# Patient Record
Sex: FEMALE | Race: ASIAN | Employment: UNEMPLOYED | URBAN - METROPOLITAN AREA
[De-identification: names, ages, dates, MRNs, and addresses within clinical notes are randomized per-mention and may not be internally consistent; named-entity substitution may affect disease eponyms.]

---

## 2021-06-29 ENCOUNTER — TELEPHONE (OUTPATIENT)
Dept: OBGYN CLINIC | Facility: CLINIC | Age: 31
End: 2021-06-29

## 2021-06-29 NOTE — TELEPHONE ENCOUNTER
Request for medical records was faxed to Gabriella Cedillo @ [phone].  # [phone] ext [address] [id number].

## 2021-06-30 ENCOUNTER — TELEPHONE (OUTPATIENT)
Dept: OBGYN CLINIC | Facility: CLINIC | Age: 31
End: 2021-06-30

## 2021-06-30 ENCOUNTER — INITIAL PRENATAL (OUTPATIENT)
Dept: OBGYN CLINIC | Facility: CLINIC | Age: 31
End: 2021-06-30
Payer: COMMERCIAL

## 2021-06-30 VITALS
HEIGHT: 66 IN | BODY MASS INDEX: 29.61 KG/M2 | WEIGHT: 184.25 LBS | DIASTOLIC BLOOD PRESSURE: 66 MMHG | HEART RATE: 82 BPM | SYSTOLIC BLOOD PRESSURE: 98 MMHG

## 2021-06-30 DIAGNOSIS — Z3A.11 11 WEEKS GESTATION OF PREGNANCY: ICD-10-CM

## 2021-06-30 DIAGNOSIS — Z34.81 ENCOUNTER FOR SUPERVISION OF OTHER NORMAL PREGNANCY IN FIRST TRIMESTER: Primary | ICD-10-CM

## 2021-06-30 PROBLEM — O99.211 OBESITY AFFECTING PREGNANCY IN FIRST TRIMESTER: Status: ACTIVE | Noted: 2021-06-30

## 2021-06-30 PROBLEM — E03.9 ACQUIRED HYPOTHYROIDISM: Status: ACTIVE | Noted: 2021-06-30

## 2021-06-30 PROBLEM — O99.211 OBESITY AFFECTING PREGNANCY IN FIRST TRIMESTER (HCC): Status: ACTIVE | Noted: 2021-06-30

## 2021-06-30 PROCEDURE — 3074F SYST BP LT 130 MM HG: CPT | Performed by: OBSTETRICS & GYNECOLOGY

## 2021-06-30 PROCEDURE — 3078F DIAST BP <80 MM HG: CPT | Performed by: OBSTETRICS & GYNECOLOGY

## 2021-06-30 PROCEDURE — 81002 URINALYSIS NONAUTO W/O SCOPE: CPT | Performed by: OBSTETRICS & GYNECOLOGY

## 2021-06-30 PROCEDURE — 3008F BODY MASS INDEX DOCD: CPT | Performed by: OBSTETRICS & GYNECOLOGY

## 2021-06-30 RX ORDER — BIOTIN 1 MG
1 TABLET ORAL DAILY
COMMUNITY
End: 2021-06-30 | Stop reason: ALTCHOICE

## 2021-06-30 RX ORDER — PROGESTERONE 200 MG/1
200 CAPSULE ORAL NIGHTLY
COMMUNITY
End: 2021-07-28

## 2021-06-30 RX ORDER — B-COMPLEX WITH VITAMIN C
TABLET ORAL ONCE
COMMUNITY
End: 2021-06-30 | Stop reason: ALTCHOICE

## 2021-06-30 RX ORDER — MULTIVITAMIN WITH IRON
TABLET ORAL ONCE
COMMUNITY
End: 2021-06-30 | Stop reason: ALTCHOICE

## 2021-06-30 RX ORDER — LEVOTHYROXINE AND LIOTHYRONINE 19; 4.5 UG/1; UG/1
30 TABLET ORAL DAILY
COMMUNITY
End: 2021-06-30 | Stop reason: ALTCHOICE

## 2021-06-30 NOTE — PROGRESS NOTES
05/20/2021 Early OB US No Fetal Pole Noted 5w5d MICHAEL: 01/15/2022    06/03/2021  Single Live IUP/Ys seen  7w1d MICHAEL: 01/19/2022

## 2021-06-30 NOTE — TELEPHONE ENCOUNTER
NIPS/Carrier screen drawn per Dr. Rooney Ripa order. Tubes labeled and placed in lab for orders and send-out.

## 2021-06-30 NOTE — PROGRESS NOTES
Transfer OB  - denies h/o HSV, blood transfusion, hepatitis  - cfDNA and carrier scree reviewed  - not sure about LMP, EDC by 7 week 1 day ultrasound  - PNL reviewed, HIV and antibody screen ordered    1.  Hypothyroidism  - takes medication from [facility]   - T

## 2021-07-03 ENCOUNTER — TELEPHONE (OUTPATIENT)
Dept: OBGYN CLINIC | Facility: CLINIC | Age: 31
End: 2021-07-03

## 2021-07-03 DIAGNOSIS — N93.9 VAGINAL SPOTTING: Primary | ICD-10-CM

## 2021-07-04 NOTE — TELEPHONE ENCOUNTER
On call physician    31year old  at [address]   B+    C/o vaginal spotting with wiping after a harder BM this evening for which she had to strain. No other issues other than intermittent dysuria occasionally since last visit.  Does not think she has a UTI

## 2021-07-05 LAB
AMB EXT MYRIAD TRISOMY 13: NEGATIVE
AMB EXT MYRIAD TRISOMY 18: NEGATIVE
AMB EXT MYRIAD TRISOMY 21: NEGATIVE

## 2021-07-09 ENCOUNTER — TELEPHONE (OUTPATIENT)
Dept: OBGYN CLINIC | Facility: CLINIC | Age: 31
End: 2021-07-09

## 2021-07-09 LAB
AMB EXT CYSTIC FIBROSIS ALLELE 1: NEGATIVE
AMB EXT MYRIAD FORESIGHT: NEGATIVE
AMB EXT SICKLE CELL SOLUBILITY: NEGATIVE

## 2021-07-26 ENCOUNTER — TELEPHONE (OUTPATIENT)
Dept: OBGYN CLINIC | Facility: CLINIC | Age: 31
End: 2021-07-26

## 2021-07-26 DIAGNOSIS — E03.9 HYPOTHYROIDISM AFFECTING PREGNANCY IN SECOND TRIMESTER: Primary | ICD-10-CM

## 2021-07-26 DIAGNOSIS — O99.282 HYPOTHYROIDISM AFFECTING PREGNANCY IN SECOND TRIMESTER: Primary | ICD-10-CM

## 2021-07-26 RX ORDER — LEVOTHYROXINE SODIUM 0.05 MG/1
50 TABLET ORAL
Qty: 30 TABLET | Refills: 11 | Status: SHIPPED | OUTPATIENT
Start: 2021-07-26 | End: 2021-10-01 | Stop reason: DRUGHIGH

## 2021-07-26 RX ORDER — LEVOTHYROXINE SODIUM 0.03 MG/1
12.5 TABLET ORAL
Qty: 15 TABLET | Refills: 11 | Status: SHIPPED | OUTPATIENT
Start: 2021-07-26 | End: 2021-10-01 | Stop reason: ALTCHOICE

## 2021-07-26 NOTE — TELEPHONE ENCOUNTER
Patient has 2 days left of her Thyronorm (thyroxine sodium) 62.5 mcg left. This medication is from [facility] and will need a replacement. Will route to provider for advisement. Has appt 7/28. Pt also c/o rash near butt and vagina.  Has been using coconut oil an

## 2021-07-26 NOTE — TELEPHONE ENCOUNTER
Patient is currently taking thyroid medication from [facility] and is asking if we can provide refill. Patient not sure about the name of the medication.    Patient is also experiencing rash around her vaginal area

## 2021-07-27 NOTE — TELEPHONE ENCOUNTER
Pt aware labs and meds ordered. Understanding verbalized. Pt states vaginal itching is a little more bothersome, advised to use vagisil otc and discuss with provider at appt tomorrow. Understanding verbalized.

## 2021-07-28 ENCOUNTER — ROUTINE PRENATAL (OUTPATIENT)
Dept: OBGYN CLINIC | Facility: CLINIC | Age: 31
End: 2021-07-28
Payer: COMMERCIAL

## 2021-07-28 VITALS
WEIGHT: 189.81 LBS | HEIGHT: 66 IN | SYSTOLIC BLOOD PRESSURE: 120 MMHG | DIASTOLIC BLOOD PRESSURE: 76 MMHG | BODY MASS INDEX: 30.51 KG/M2

## 2021-07-28 DIAGNOSIS — O26.892 LOW BACK PAIN DURING PREGNANCY IN SECOND TRIMESTER: ICD-10-CM

## 2021-07-28 DIAGNOSIS — O99.282 HYPOTHYROIDISM AFFECTING PREGNANCY IN SECOND TRIMESTER: ICD-10-CM

## 2021-07-28 DIAGNOSIS — O99.212 OBESITY AFFECTING PREGNANCY IN SECOND TRIMESTER: Primary | ICD-10-CM

## 2021-07-28 DIAGNOSIS — M54.50 LOW BACK PAIN DURING PREGNANCY IN SECOND TRIMESTER: ICD-10-CM

## 2021-07-28 DIAGNOSIS — E28.2 PCOS (POLYCYSTIC OVARIAN SYNDROME): ICD-10-CM

## 2021-07-28 DIAGNOSIS — E55.9 VITAMIN D DEFICIENCY: ICD-10-CM

## 2021-07-28 DIAGNOSIS — E03.9 HYPOTHYROIDISM AFFECTING PREGNANCY IN SECOND TRIMESTER: ICD-10-CM

## 2021-07-28 DIAGNOSIS — Z36.89 ENCOUNTER FOR FETAL ANATOMIC SURVEY: ICD-10-CM

## 2021-07-28 DIAGNOSIS — Z34.02 PREGNANCY, FIRST, SECOND TRIMESTER: ICD-10-CM

## 2021-07-28 PROBLEM — Z12.4 PAP SMEAR FOR CERVICAL CANCER SCREENING: Status: ACTIVE | Noted: 2021-01-22

## 2021-07-28 LAB
GLUCOSE (URINE DIPSTICK): NEGATIVE MG/DL
MULTISTIX LOT#: NORMAL NUMERIC
PROTEIN (URINE DIPSTICK): NEGATIVE MG/DL

## 2021-07-28 PROCEDURE — 3008F BODY MASS INDEX DOCD: CPT | Performed by: OBSTETRICS & GYNECOLOGY

## 2021-07-28 PROCEDURE — 3078F DIAST BP <80 MM HG: CPT | Performed by: OBSTETRICS & GYNECOLOGY

## 2021-07-28 PROCEDURE — 3074F SYST BP LT 130 MM HG: CPT | Performed by: OBSTETRICS & GYNECOLOGY

## 2021-07-28 PROCEDURE — 81002 URINALYSIS NONAUTO W/O SCOPE: CPT | Performed by: OBSTETRICS & GYNECOLOGY

## 2021-07-28 NOTE — PROGRESS NOTES
LALO  No FM. No cramping, vaginal bleeding. C/o some itching & rash of thighs - not too much near the vagina. Has been using coconut oil. Not feeling very sweaty. Discharge normal  Some sinus pressures/congestation & HA.    Legs feel heavy & sore for the

## 2021-07-28 NOTE — PATIENT INSTRUCTIONS
Please have TSH & 1 hour glucose (and AFP if desired) done soon. Increase protein intake. Caution with carbohydrates.      Nasal saline & nasal corticosteroid (Nasocort/Rhinocort)     AFP Level   There is an optional blood test that we can perform on yo to try.

## 2021-07-30 ENCOUNTER — TELEPHONE (OUTPATIENT)
Dept: OBGYN CLINIC | Facility: CLINIC | Age: 31
End: 2021-07-30

## 2021-07-30 ENCOUNTER — NURSE ONLY (OUTPATIENT)
Dept: OBGYN CLINIC | Facility: CLINIC | Age: 31
End: 2021-07-30
Payer: COMMERCIAL

## 2021-07-30 VITALS
BODY MASS INDEX: 31.02 KG/M2 | SYSTOLIC BLOOD PRESSURE: 114 MMHG | HEART RATE: 78 BPM | WEIGHT: 193 LBS | HEIGHT: 66 IN | DIASTOLIC BLOOD PRESSURE: 62 MMHG

## 2021-07-30 DIAGNOSIS — R39.9 URINARY SYMPTOM OR SIGN: Primary | ICD-10-CM

## 2021-07-30 LAB
APPEARANCE: CLEAR
BILIRUBIN: NEGATIVE
GLUCOSE (URINE DIPSTICK): NEGATIVE MG/DL
KETONES (URINE DIPSTICK): NEGATIVE MG/DL
LEUKOCYTES: NEGATIVE
MULTISTIX LOT#: NORMAL NUMERIC
NITRITE, URINE: NEGATIVE
OCCULT BLOOD: NEGATIVE
PH, URINE: 7 (ref 4.5–8)
PROTEIN (URINE DIPSTICK): NEGATIVE MG/DL
SPECIFIC GRAVITY: 1.02 (ref 1–1.03)
URINE-COLOR: YELLOW
UROBILINOGEN,SEMI-QN: 0.2 MG/DL (ref 0–1.9)

## 2021-07-30 PROCEDURE — 87086 URINE CULTURE/COLONY COUNT: CPT | Performed by: OBSTETRICS & GYNECOLOGY

## 2021-07-30 PROCEDURE — 3078F DIAST BP <80 MM HG: CPT | Performed by: OBSTETRICS & GYNECOLOGY

## 2021-07-30 PROCEDURE — 3008F BODY MASS INDEX DOCD: CPT | Performed by: OBSTETRICS & GYNECOLOGY

## 2021-07-30 PROCEDURE — 81002 URINALYSIS NONAUTO W/O SCOPE: CPT | Performed by: OBSTETRICS & GYNECOLOGY

## 2021-07-30 PROCEDURE — 3074F SYST BP LT 130 MM HG: CPT | Performed by: OBSTETRICS & GYNECOLOGY

## 2021-07-30 NOTE — TELEPHONE ENCOUNTER
Pt states she is having urinary pain, abdominal, and urgency/frequency. Per KD, pt scheduled for nurse visit urine screen. Understanding verbalized.

## 2021-08-06 ENCOUNTER — PATIENT MESSAGE (OUTPATIENT)
Dept: OBGYN CLINIC | Facility: CLINIC | Age: 31
End: 2021-08-06

## 2021-08-06 ENCOUNTER — TELEPHONE (OUTPATIENT)
Dept: OBGYN CLINIC | Facility: CLINIC | Age: 31
End: 2021-08-06

## 2021-08-06 PROBLEM — O99.810 ABNORMAL GLUCOSE TOLERANCE TEST (GTT) DURING PREGNANCY, ANTEPARTUM: Status: ACTIVE | Noted: 2021-08-06

## 2021-08-06 PROBLEM — O99.810 ABNORMAL GLUCOSE TOLERANCE TEST (GTT) DURING PREGNANCY, ANTEPARTUM (HCC): Status: ACTIVE | Noted: 2021-08-06

## 2021-08-06 LAB
ABSOLUTE BASOPHILS: 47 CELLS/UL (ref 0–200)
ABSOLUTE EOSINOPHILS: 153 CELLS/UL (ref 15–500)
ABSOLUTE LYMPHOCYTES: 2832 CELLS/UL (ref 850–3900)
ABSOLUTE MONOCYTES: 673 CELLS/UL (ref 200–950)
ABSOLUTE NEUTROPHILS: 8095 CELLS/UL (ref 1500–7800)
BASOPHILS: 0.4 %
EOSINOPHILS: 1.3 %
GLUCOSE, GESTATIONAL SCREEN (50G)-130 CUTOFF: 157 MG/DL
HEMATOCRIT: 35.6 % (ref 35–45)
HEMOGLOBIN: 11.6 G/DL (ref 11.7–15.5)
LYMPHOCYTES: 24 %
MCH: 28.2 PG (ref 27–33)
MCHC: 32.6 G/DL (ref 32–36)
MCV: 86.6 FL (ref 80–100)
MONOCYTES: 5.7 %
MPV: 11.9 FL (ref 7.5–12.5)
NEUTROPHILS: 68.6 %
PLATELET COUNT: 225 THOUSAND/UL (ref 140–400)
RDW: 12.9 % (ref 11–15)
RED BLOOD CELL COUNT: 4.11 MILLION/UL (ref 3.8–5.1)
TSH W/REFLEX TO FT4: 1.84 MIU/L
WHITE BLOOD CELL COUNT: 11.8 THOUSAND/UL (ref 3.8–10.8)

## 2021-08-06 NOTE — PROGRESS NOTES
Pt advised of results and recommendations for :  Anemia noted. Make sure taking prenatal vitamin that contains iron daily. Add in ferrous sulfate 325 mg tablet once daily at least 4 hours apart from prenatal vitamin.  Can get over the counter.      To maxim

## 2021-08-10 NOTE — TELEPHONE ENCOUNTER
From: Christal Landers  To: Pradeep Perrin MD  Sent: 8/6/2021 8:02 AM CDT  Subject: Test Results Question    Hi doc, the results i have received show no fasting but I was fasting for the 1 hr glucose test, does it make any difference?  I will go ahead and beatriz

## 2021-08-27 ENCOUNTER — ROUTINE PRENATAL (OUTPATIENT)
Dept: OBGYN CLINIC | Facility: CLINIC | Age: 31
End: 2021-08-27
Payer: COMMERCIAL

## 2021-08-27 VITALS
BODY MASS INDEX: 32.47 KG/M2 | WEIGHT: 202 LBS | DIASTOLIC BLOOD PRESSURE: 62 MMHG | HEIGHT: 66 IN | HEART RATE: 101 BPM | SYSTOLIC BLOOD PRESSURE: 102 MMHG

## 2021-08-27 DIAGNOSIS — Z3A.19 19 WEEKS GESTATION OF PREGNANCY: ICD-10-CM

## 2021-08-27 DIAGNOSIS — Z34.02 ENCOUNTER FOR SUPERVISION OF NORMAL FIRST PREGNANCY IN SECOND TRIMESTER: Primary | ICD-10-CM

## 2021-08-27 PROCEDURE — 3074F SYST BP LT 130 MM HG: CPT | Performed by: OBSTETRICS & GYNECOLOGY

## 2021-08-27 PROCEDURE — 3078F DIAST BP <80 MM HG: CPT | Performed by: OBSTETRICS & GYNECOLOGY

## 2021-08-27 PROCEDURE — 81002 URINALYSIS NONAUTO W/O SCOPE: CPT | Performed by: OBSTETRICS & GYNECOLOGY

## 2021-08-27 PROCEDURE — 3008F BODY MASS INDEX DOCD: CPT | Performed by: OBSTETRICS & GYNECOLOGY

## 2021-08-27 NOTE — PROGRESS NOTES
Patient c/o occasional round ligament discomfort  - failed early 1GTT at 16 weeks, passed 3 GTT - repeat after 24 weeks  - has ultrasound with MFM scheduled next week  - cfDNA and carrier screen negative, gender reveal this weekend      1.  Hypothyroidism

## 2021-09-01 ENCOUNTER — OFFICE VISIT (OUTPATIENT)
Dept: PERINATAL CARE | Facility: HOSPITAL | Age: 31
End: 2021-09-01
Attending: OBSTETRICS & GYNECOLOGY
Payer: COMMERCIAL

## 2021-09-01 VITALS
WEIGHT: 202 LBS | HEART RATE: 76 BPM | BODY MASS INDEX: 33 KG/M2 | DIASTOLIC BLOOD PRESSURE: 78 MMHG | SYSTOLIC BLOOD PRESSURE: 127 MMHG

## 2021-09-01 DIAGNOSIS — E03.9 HYPOTHYROIDISM AFFECTING PREGNANCY IN SECOND TRIMESTER: ICD-10-CM

## 2021-09-01 DIAGNOSIS — O99.282 HYPOTHYROIDISM AFFECTING PREGNANCY IN SECOND TRIMESTER: Primary | ICD-10-CM

## 2021-09-01 DIAGNOSIS — E03.9 HYPOTHYROIDISM AFFECTING PREGNANCY IN SECOND TRIMESTER: Primary | ICD-10-CM

## 2021-09-01 DIAGNOSIS — O99.212 OBESITY AFFECTING PREGNANCY IN SECOND TRIMESTER: ICD-10-CM

## 2021-09-01 DIAGNOSIS — E66.3 PATIENT OVERWEIGHT: ICD-10-CM

## 2021-09-01 DIAGNOSIS — O99.282 HYPOTHYROIDISM AFFECTING PREGNANCY IN SECOND TRIMESTER: ICD-10-CM

## 2021-09-01 PROCEDURE — 99203 OFFICE O/P NEW LOW 30 MIN: CPT | Performed by: OBSTETRICS & GYNECOLOGY

## 2021-09-01 PROCEDURE — 76811 OB US DETAILED SNGL FETUS: CPT | Performed by: OBSTETRICS & GYNECOLOGY

## 2021-09-01 RX ORDER — MELATONIN
325
COMMUNITY

## 2021-09-01 NOTE — PROGRESS NOTES
[facility] Consultation    Dear Dr. Osito Bazan    Thank you for requesting ultrasound evaluation and maternal fetal medicine consultation on your patient [place] Julio.   As you are aware she is a 27year old female  with a singlet • Thyroid Disorder Mother    • Hypertension Mother       Social History    Tobacco Use      Smoking status: Never Smoker      Smokeless tobacco: Never Used    Vaping Use      Vaping Use: Never used    Alcohol use: Not Currently    Drug use: Never rate of first trimester spontaneous . The risk of complications during pregnancy is lower in women with subclinical, rather than overt hypothyroidism.  However, in some studies, women with subclinical hypothyroidism were also reported to be at incr during pregnancy are 1.5-fold higher than in nonpregnant women. We do not suggest the treatment of pregnant women with isolated hypothyroxinemia (low free T4, normal TSH).   Thyroid function should be monitored throughout the pregnancy by assessing TSH ev

## 2021-09-10 ENCOUNTER — TELEPHONE (OUTPATIENT)
Dept: OBGYN CLINIC | Facility: CLINIC | Age: 31
End: 2021-09-10

## 2021-09-10 NOTE — TELEPHONE ENCOUNTER
Pt calling to speak to a nurse about headaches and pressure in her head. Pt states it could be allergies but she has not been taking anything and would like to know what she can take. Please advise.

## 2021-09-10 NOTE — TELEPHONE ENCOUNTER
Spoke with patient and discussed pregnancy safe medications for pregnancy that was placed in her mychart. She also wanted to know if she should increase her thyroid medication based on her recent labs.  She is on 62.5 of levothyroxine and her levels were WN

## 2021-09-24 ENCOUNTER — ROUTINE PRENATAL (OUTPATIENT)
Dept: OBGYN CLINIC | Facility: CLINIC | Age: 31
End: 2021-09-24
Payer: COMMERCIAL

## 2021-09-24 VITALS
WEIGHT: 209.19 LBS | BODY MASS INDEX: 33.62 KG/M2 | DIASTOLIC BLOOD PRESSURE: 76 MMHG | HEART RATE: 75 BPM | SYSTOLIC BLOOD PRESSURE: 122 MMHG | HEIGHT: 66 IN

## 2021-09-24 DIAGNOSIS — E03.9 HYPOTHYROIDISM AFFECTING PREGNANCY IN SECOND TRIMESTER: ICD-10-CM

## 2021-09-24 DIAGNOSIS — O99.282 HYPOTHYROIDISM AFFECTING PREGNANCY IN SECOND TRIMESTER: ICD-10-CM

## 2021-09-24 DIAGNOSIS — Z34.92 ENCOUNTER FOR SUPERVISION OF NORMAL PREGNANCY IN SECOND TRIMESTER, UNSPECIFIED GRAVIDITY: Primary | ICD-10-CM

## 2021-09-24 PROCEDURE — 3074F SYST BP LT 130 MM HG: CPT | Performed by: STUDENT IN AN ORGANIZED HEALTH CARE EDUCATION/TRAINING PROGRAM

## 2021-09-24 PROCEDURE — 3008F BODY MASS INDEX DOCD: CPT | Performed by: STUDENT IN AN ORGANIZED HEALTH CARE EDUCATION/TRAINING PROGRAM

## 2021-09-24 PROCEDURE — 3078F DIAST BP <80 MM HG: CPT | Performed by: STUDENT IN AN ORGANIZED HEALTH CARE EDUCATION/TRAINING PROGRAM

## 2021-09-24 PROCEDURE — 81002 URINALYSIS NONAUTO W/O SCOPE: CPT | Performed by: STUDENT IN AN ORGANIZED HEALTH CARE EDUCATION/TRAINING PROGRAM

## 2021-09-24 NOTE — PROGRESS NOTES
Shoulder pain & sciatica pain flaring up, offered PT but pt may just try to do more yoga  Allergies--assured OK to take claritin  Asked about Vit D--ok to take 8267-4970/d    - failed early 1GTT at 16 weeks, passed 3 GTT - repeat & CBC ordered for 24wk  -

## 2021-09-27 ENCOUNTER — PATIENT MESSAGE (OUTPATIENT)
Dept: OBGYN CLINIC | Facility: CLINIC | Age: 31
End: 2021-09-27

## 2021-09-28 ENCOUNTER — TELEPHONE (OUTPATIENT)
Dept: OBGYN CLINIC | Facility: CLINIC | Age: 31
End: 2021-09-28

## 2021-09-28 ENCOUNTER — HOSPITAL ENCOUNTER (OUTPATIENT)
Facility: HOSPITAL | Age: 31
Setting detail: OBSERVATION
Discharge: HOME | End: 2021-09-28
Attending: STUDENT IN AN ORGANIZED HEALTH CARE EDUCATION/TRAINING PROGRAM | Admitting: STUDENT IN AN ORGANIZED HEALTH CARE EDUCATION/TRAINING PROGRAM
Payer: COMMERCIAL

## 2021-09-28 VITALS
WEIGHT: 209 LBS | DIASTOLIC BLOOD PRESSURE: 72 MMHG | RESPIRATION RATE: 16 BRPM | SYSTOLIC BLOOD PRESSURE: 119 MMHG | HEART RATE: 74 BPM | BODY MASS INDEX: 33.59 KG/M2 | HEIGHT: 66 IN | TEMPERATURE: 99 F

## 2021-09-28 PROBLEM — Z34.90 PREGNANCY: Status: ACTIVE | Noted: 2021-09-28

## 2021-09-28 PROBLEM — Z34.90 PREGNANCY (HCC): Status: ACTIVE | Noted: 2021-09-28

## 2021-09-28 PROCEDURE — 99212 OFFICE O/P EST SF 10 MIN: CPT

## 2021-09-28 NOTE — TELEPHONE ENCOUNTER
From: Columba Carrillo  To: Gadiel Daniels MD  Sent: 9/27/2021 2:04 PM CDT  Subject: Prenatemini prescription     Hi Doc,  I was just following up from my last visit, I like the prenatemini tablets that you gave as a sample.  Is it possible for sending a pres

## 2021-09-28 NOTE — TELEPHONE ENCOUNTER
Spoke with pt regarding previous tel encounter from 9/288/21. Advised to go to l&d since headache unrelieved after 1000 mg of tylenol. Understanding verbalized. L&D notified.

## 2021-09-28 NOTE — TELEPHONE ENCOUNTER
Pt states she is having leg pain, and ankle and joint pain, some swelling around joints. Advised to call pcp as problem is not pregnancy related or gynecological in nature. Understanding verbalized.    Pt advised order is in for PT since July, can call and

## 2021-09-29 RX ORDER — ASCORBIC ACID, CHOLECALCIFEROL, .ALPHA.-TOCOPHEROL ACETATE, DL-, PYRIDOXINE HYDROCHLORIDE, FOLIC ACID, CYANOCOBALAMIN, BIOTIN, CALCIUM CARBONATE, FERROUS ASPARTO GLYCINATE, IRON, POTASSIUM IODIDE, MAGNESIUM OXIDE, DOCONEXENT AND LOWBUSH BLUEBERRY 60; 1000; 10; 26; 400; 13; 280; 80; 9; 9; 150; 25; 350; 25; 600 MG/1; [IU]/1; [IU]/1; MG/1; UG/1; UG/1; UG/1; MG/1; MG/1; MG/1; UG/1; MG/1; MG/1; MG/1; UG/1
1 CAPSULE, GELATIN COATED ORAL DAILY
Qty: 90 CAPSULE | Refills: 3 | Status: SHIPPED | OUTPATIENT
Start: 2021-09-29 | End: 2021-10-29

## 2021-09-29 NOTE — NST
Nonstress Test   Patient: Aga Villeda    Gestation: 23w6d    NST:       Variability: Moderate           Accelerations: Yes           Decelerations: Variable            Baseline: 140 BPM           Uterine Irritability: No           Contractions: Not presen

## 2021-09-29 NOTE — PROGRESS NOTES
NST appropriate for gestational age
Pt , edc 22 (23 6/7wks) admitted to triage rm 3 with c/o headache all day. Pt took Tylenol ES at 1300 with no relief. Pt states she took [address] last week for a stuff nose and headache and that it was effective but she has not taken any today.  Pt
poc discussed with pt & spouse. Pt would rather go home and f/u with primary doctor tomorrow vs going to ER now. Pt states she will go home, take Clariton and rest. Discharge instructions reviewed with pt & spouse, verbalizes understanding.  Pt discharged t
room air

## 2021-09-30 ENCOUNTER — TELEPHONE (OUTPATIENT)
Dept: FAMILY MEDICINE CLINIC | Facility: CLINIC | Age: 31
End: 2021-09-30

## 2021-09-30 NOTE — TELEPHONE ENCOUNTER
S/w pt regarding her appt for tomorrow. Reports having pain in rt leg up to her shoulder \"for a few days\". Reports intermittent. Hx of shoulder tendinitis. Feels muscles have been tight. Had similar pain on L leg but walking would help.   Feels leg i

## 2021-09-30 NOTE — TELEPHONE ENCOUNTER
We will see her tomorrow. At any point worsening symptoms proceed to urgent care for evaluation , may need ultrasound to rule out clot.   Thank you

## 2021-10-01 ENCOUNTER — OFFICE VISIT (OUTPATIENT)
Dept: FAMILY MEDICINE CLINIC | Facility: CLINIC | Age: 31
End: 2021-10-01
Payer: COMMERCIAL

## 2021-10-01 ENCOUNTER — TELEPHONE (OUTPATIENT)
Dept: FAMILY MEDICINE CLINIC | Facility: CLINIC | Age: 31
End: 2021-10-01

## 2021-10-01 ENCOUNTER — HOSPITAL ENCOUNTER (OUTPATIENT)
Dept: ULTRASOUND IMAGING | Facility: HOSPITAL | Age: 31
Discharge: HOME | End: 2021-10-01
Attending: FAMILY MEDICINE
Payer: COMMERCIAL

## 2021-10-01 VITALS
DIASTOLIC BLOOD PRESSURE: 58 MMHG | RESPIRATION RATE: 18 BRPM | OXYGEN SATURATION: 100 % | WEIGHT: 208 LBS | TEMPERATURE: 98 F | SYSTOLIC BLOOD PRESSURE: 104 MMHG | BODY MASS INDEX: 33.43 KG/M2 | HEIGHT: 66 IN | HEART RATE: 85 BPM

## 2021-10-01 DIAGNOSIS — M54.50 RECURRENT LOW BACK PAIN: ICD-10-CM

## 2021-10-01 DIAGNOSIS — M79.604 ACUTE LEG PAIN, RIGHT: ICD-10-CM

## 2021-10-01 DIAGNOSIS — M54.2 NECK PAIN ON RIGHT SIDE: ICD-10-CM

## 2021-10-01 DIAGNOSIS — M79.604 ACUTE LEG PAIN, RIGHT: Primary | ICD-10-CM

## 2021-10-01 DIAGNOSIS — M79.89 RIGHT LEG SWELLING: ICD-10-CM

## 2021-10-01 DIAGNOSIS — Z3A.24 24 WEEKS GESTATION OF PREGNANCY: ICD-10-CM

## 2021-10-01 PROCEDURE — 3008F BODY MASS INDEX DOCD: CPT | Performed by: FAMILY MEDICINE

## 2021-10-01 PROCEDURE — 93971 EXTREMITY STUDY: CPT | Performed by: FAMILY MEDICINE

## 2021-10-01 PROCEDURE — 99204 OFFICE O/P NEW MOD 45 MIN: CPT | Performed by: FAMILY MEDICINE

## 2021-10-01 PROCEDURE — 3078F DIAST BP <80 MM HG: CPT | Performed by: FAMILY MEDICINE

## 2021-10-01 PROCEDURE — 3074F SYST BP LT 130 MM HG: CPT | Performed by: FAMILY MEDICINE

## 2021-10-01 RX ORDER — MULTIVITAMIN WITH IRON
1 TABLET ORAL AS NEEDED
COMMUNITY
Start: 2021-08-09

## 2021-10-01 NOTE — PROGRESS NOTES
Adwoa Felix is a 27year old female. cc right leg pain, low back pain, right shoulder arm pain/neck pain,  24 weeks pregnant  HPI:   Pts new in our office patient is 24 weeks pregnant.   Patient complains that for the last 4 days started to have a pain in h Diagnosis Date   • Chronic low back pain     predating pregnancy, pt points to SI joint region   • Hypothyroidism    • Pap smear for cervical cancer screening 01/22/2021    Pap & HPV negative with outside provider   • PCOS (polycystic ovarian syndrome) side  Recurrent low back pain    No orders of the defined types were placed in this encounter.       Meds & Refills for this Visit:  Requested Prescriptions      No prescriptions requested or ordered in this encounter   Do ultrasound venous Doppler today of

## 2021-10-01 NOTE — PATIENT INSTRUCTIONS
Do ultrasound venous Doppler today of your right leg. If the test is negative for blood clot we will have you to try icy hot topically to your lower back and right side of the neck as needed. Warm compresses to your neck. Try some yoga stretches.   Sched

## 2021-10-08 ENCOUNTER — TELEPHONE (OUTPATIENT)
Dept: PHYSICAL THERAPY | Facility: HOSPITAL | Age: 31
End: 2021-10-08

## 2021-10-13 ENCOUNTER — TELEPHONE (OUTPATIENT)
Dept: OBGYN CLINIC | Facility: CLINIC | Age: 31
End: 2021-10-13

## 2021-10-13 ENCOUNTER — OFFICE VISIT (OUTPATIENT)
Dept: PHYSICAL THERAPY | Facility: HOSPITAL | Age: 31
End: 2021-10-13
Attending: OBSTETRICS & GYNECOLOGY
Payer: COMMERCIAL

## 2021-10-13 DIAGNOSIS — O26.892 LOW BACK PAIN DURING PREGNANCY IN SECOND TRIMESTER: ICD-10-CM

## 2021-10-13 DIAGNOSIS — O99.810 ABNORMAL MATERNAL GLUCOSE TOLERANCE, ANTEPARTUM: Primary | ICD-10-CM

## 2021-10-13 DIAGNOSIS — M54.50 LOW BACK PAIN DURING PREGNANCY IN SECOND TRIMESTER: ICD-10-CM

## 2021-10-13 PROCEDURE — 97162 PT EVAL MOD COMPLEX 30 MIN: CPT

## 2021-10-13 PROCEDURE — 97110 THERAPEUTIC EXERCISES: CPT

## 2021-10-13 PROCEDURE — 97140 MANUAL THERAPY 1/> REGIONS: CPT

## 2021-10-13 NOTE — TELEPHONE ENCOUNTER
Pt is calling to ask about the glucose test. Pt states Dr. Rock Guallpa told her to get it done (she doesn't remember when) but the lab stated they do not have the order. Please advise if pt needs Glucose test done now or wait.

## 2021-10-13 NOTE — TELEPHONE ENCOUNTER
I spoke with pt. I advised her to get her 3 hour glucose test done as soon as possible. I placed a new order for [facility] per her request. Verbalized understanding.

## 2021-10-13 NOTE — PROGRESS NOTES
SPINE EVALUATION:   Referring Physician: Dr. Wing Dexter, Dr. Halina Drake  Diagnosis: 24 weeks gestation of pregnancy (Z3A.24)  Neck pain on right side (M54.2)  Recurrent low back pain (M54.50)          Date of Service: 10/13/2021     PATIENT SUMMARY   Ak tension R sciatic nn, but cervical nn testing was not positive in clinic today. Increased symptoms may be attributed to hormonal changes due to pregnancy.   Functional deficits include but are not limited to  cooking, sitting, housework, sleeping distrubanc 4+/5  Hip Extension: R 4-/5; L 4+/5   Hip ER: R 5/5; L 5/5  Hip IR: R 5/5; L 5/5  Knee Flexion: R 5/5; L 5/5   Knee extension (L3): R 5/5; L 5/5   DF (L4): R 5/5; L 5/5  Great Toe Ext (L5): R 5/5, L 5/5  PF (S1): R 5/5; L 5/5-non weightbearing     Flexibil transversus abdominis recruitment to perform proper isometric contraction without requiring verbal or tactile cuing to promote advancement of therex   · Pt will demonstrate good understanding of proper posture and body mechanics to decrease pain and improv treatment and while under my care.     X___________________________________________________ Date____________________    Certification From: 17/47/2917  To:1/11/2022

## 2021-10-15 ENCOUNTER — ROUTINE PRENATAL (OUTPATIENT)
Dept: OBGYN CLINIC | Facility: CLINIC | Age: 31
End: 2021-10-15
Payer: COMMERCIAL

## 2021-10-15 VITALS
WEIGHT: 214.38 LBS | HEIGHT: 66 IN | BODY MASS INDEX: 34.45 KG/M2 | DIASTOLIC BLOOD PRESSURE: 74 MMHG | SYSTOLIC BLOOD PRESSURE: 122 MMHG | HEART RATE: 69 BPM

## 2021-10-15 DIAGNOSIS — Z3A.26 26 WEEKS GESTATION OF PREGNANCY: ICD-10-CM

## 2021-10-15 DIAGNOSIS — Z23 NEED FOR VACCINATION: ICD-10-CM

## 2021-10-15 DIAGNOSIS — Z34.92 ENCOUNTER FOR SUPERVISION OF NORMAL PREGNANCY IN SECOND TRIMESTER, UNSPECIFIED GRAVIDITY: Primary | ICD-10-CM

## 2021-10-15 PROCEDURE — 3074F SYST BP LT 130 MM HG: CPT | Performed by: OBSTETRICS & GYNECOLOGY

## 2021-10-15 PROCEDURE — 3078F DIAST BP <80 MM HG: CPT | Performed by: OBSTETRICS & GYNECOLOGY

## 2021-10-15 PROCEDURE — 3008F BODY MASS INDEX DOCD: CPT | Performed by: OBSTETRICS & GYNECOLOGY

## 2021-10-15 PROCEDURE — 90686 IIV4 VACC NO PRSV 0.5 ML IM: CPT | Performed by: OBSTETRICS & GYNECOLOGY

## 2021-10-15 PROCEDURE — 90471 IMMUNIZATION ADMIN: CPT | Performed by: OBSTETRICS & GYNECOLOGY

## 2021-10-15 PROCEDURE — 81002 URINALYSIS NONAUTO W/O SCOPE: CPT | Performed by: OBSTETRICS & GYNECOLOGY

## 2021-10-15 RX ORDER — LORATADINE 10 MG/1
10 TABLET ORAL DAILY
COMMUNITY
End: 2021-12-30

## 2021-10-15 NOTE — PROGRESS NOTES
Patient has no complaints    Allergies--assured OK to take claritin  Asked about Vit D--ok to take 3515-7793/d    - failed 1 GTT - has 3 GTT scheduled  - normal 20 week ultrasound with MFM  - cfDNA and carrier screen negative    1.  Hypothyroidism  - levoth

## 2021-10-19 LAB
GLUCOSE, 1 HOUR: 181 MG/DL
GLUCOSE, 2 HOUR: 126 MG/DL
GLUCOSE, 3 HOUR: 63 MG/DL
GLUCOSE, FASTING: 91 MG/DL (ref 65–94)

## 2021-10-20 ENCOUNTER — OFFICE VISIT (OUTPATIENT)
Dept: PHYSICAL THERAPY | Facility: HOSPITAL | Age: 31
End: 2021-10-20
Attending: OBSTETRICS & GYNECOLOGY
Payer: COMMERCIAL

## 2021-10-20 PROCEDURE — 97110 THERAPEUTIC EXERCISES: CPT

## 2021-10-20 PROCEDURE — 97140 MANUAL THERAPY 1/> REGIONS: CPT

## 2021-10-20 NOTE — PROGRESS NOTES
Dx: 24 weeks gestation of pregnancy (Z3A.24)  Neck pain on right side (M54.2)  Recurrent low back pain (M54.50)         Authorized # of Visits:  No limit, no auth, POC 12         Next MD visit: none scheduled  Fall Risk: standard         Precautions: n/a pain to improve ease with activities of daily living . Date: 10/20/2021  Tx#: 2/12 Date: Tx#: 3/ Date: Tx#: 4/ Date: Tx#: 5/ Date: Tx#: 6/ Date: Tx#: 7/ Date:    Tx#: 8/   NuStep 6 min L1         Chin tuck 5 sec x10    Shoulder rolls bw x10 predating pregnancy, pt points to SI joint region   • Hypothyroidism    • Pap smear for cervical cancer screening 01/22/2021    Pap & HPV negative with outside provider   • PCOS (polycystic ovarian syndrome)    • Screening for genetic disease carrier statu R>L AA jt  Palpation: tenderness/tightness R cervical paraspinals/suboccipitals, upper and mid trap, levator scap, R lumbar paraspinals, gluteus medius, gluteus miller, tenderness R sciatic nn    Strength: (* denotes performed with pain)  UE/Scapular LE x10, sitting-sciatic nn glides x10, abdominal bracing 10 sec x10 , Kegel 10 repetitions 3x/day, 10 sec on/10 sec off, 2 sec on 2-4 sec off , pelvic brace + iso hip add 10 sec x10 BID  MT-STM R cervical paraspinals, suboccipitals, upper and mid trap

## 2021-10-25 ENCOUNTER — OFFICE VISIT (OUTPATIENT)
Dept: PHYSICAL THERAPY | Facility: HOSPITAL | Age: 31
End: 2021-10-25
Attending: FAMILY MEDICINE
Payer: COMMERCIAL

## 2021-10-25 PROCEDURE — 97110 THERAPEUTIC EXERCISES: CPT

## 2021-10-25 PROCEDURE — 97140 MANUAL THERAPY 1/> REGIONS: CPT

## 2021-10-25 NOTE — PROGRESS NOTES
Dx: 24 weeks gestation of pregnancy (Z3A.24)  Neck pain on right side (M54.2)  Recurrent low back pain (M54.50)         Authorized # of Visits:  No limit, no auth, POC 12         Next MD visit: none scheduled  Fall Risk: standard         Precautions: n/a compliant with comprehensive HEP to maintain progress achieved in PT     Plan: Continue plan of care as pt tolerates with focus on improving  Posture, body mechanics, reduction of muscle tightness and pain to improve ease with activities of daily living . has worsened with pregnancy. Pregnant 26 weeks. Having R LE pain that goes to ankle. Has noticed more difficulty with squeezing motions R hand.   Pt describes pain level:  Shoulder Back: 0-7/10, Neck 0-9/10 Shoulder 0-9/10  Current functional limitations in is medically necessary to address the above impairments and reach functional goals.      Precautions:  None  OBJECTIVE:   Observation/Posture: rounded shoulders, slouched sitting, increased lumbar lordosis, fair body mechanics with transferring  Neuro Scree Ligament Testing: R neg, L neg  Cervical Compression: no change  Cervical Distraction: no change  spurling's neg R and L    Gait: pt ambulates on level ground with antalgia.   Balance: SLS R 15sec, L 30 sec    Today’s Treatment and Response:   Pt education

## 2021-10-27 ENCOUNTER — OFFICE VISIT (OUTPATIENT)
Dept: PHYSICAL THERAPY | Facility: HOSPITAL | Age: 31
End: 2021-10-27
Attending: FAMILY MEDICINE
Payer: COMMERCIAL

## 2021-10-27 PROCEDURE — 97110 THERAPEUTIC EXERCISES: CPT

## 2021-10-27 PROCEDURE — 97112 NEUROMUSCULAR REEDUCATION: CPT

## 2021-10-27 NOTE — PROGRESS NOTES
Dx: 24 weeks gestation of pregnancy (Z3A.24)  Neck pain on right side (M54.2)  Recurrent low back pain (M54.50)         Authorized # of Visits:  No limit, no auth, POC 12         Next MD visit: none scheduled  Fall Risk: standard         Precautions: n/a comprehensive HEP to maintain progress achieved in PT     Plan: Continue plan of care as pt tolerates with focus on improving  Posture, body mechanics, reduction of muscle tightness and pain to improve ease with activities of daily living .  Next visit: timothy services provided     Charges: TEx2, NM Re-ed   Total Timed Treatment: 45 min     Total Treatment Time: 45 min      Initial evaluation: :    PATIENT SUMMARY   Shirlene Conn is a 27year old female who presents to therapy today with complaints of R>L pain-ba symptoms may be attributed to hormonal changes due to pregnancy. Functional deficits include but are not limited to  cooking, sitting, housework, sleeping distrubance.   Signs and symptoms are consistent with diagnosis of 24 weeks gestation of pregnancy (Z Flexion: R 5/5; L 5/5   Knee extension (L3): R 5/5; L 5/5   DF (L4): R 5/5; L 5/5  Great Toe Ext (L5): R 5/5, L 5/5  PF (S1): R 5/5; L 5/5-non weightbearing     Flexibility:   UE/Scapular LE   Upper Trap: R min; L min  Levator Scap: R min; L min  Pec Major

## 2021-11-01 ENCOUNTER — ROUTINE PRENATAL (OUTPATIENT)
Dept: OBGYN CLINIC | Facility: CLINIC | Age: 31
End: 2021-11-01
Payer: COMMERCIAL

## 2021-11-01 VITALS
BODY MASS INDEX: 35.36 KG/M2 | WEIGHT: 220 LBS | HEART RATE: 81 BPM | SYSTOLIC BLOOD PRESSURE: 120 MMHG | DIASTOLIC BLOOD PRESSURE: 76 MMHG | HEIGHT: 66 IN

## 2021-11-01 DIAGNOSIS — Z34.93 ENCOUNTER FOR SUPERVISION OF NORMAL PREGNANCY IN THIRD TRIMESTER, UNSPECIFIED GRAVIDITY: Primary | ICD-10-CM

## 2021-11-01 DIAGNOSIS — O99.213 OBESITY AFFECTING PREGNANCY IN THIRD TRIMESTER: ICD-10-CM

## 2021-11-01 PROCEDURE — 90715 TDAP VACCINE 7 YRS/> IM: CPT | Performed by: STUDENT IN AN ORGANIZED HEALTH CARE EDUCATION/TRAINING PROGRAM

## 2021-11-01 PROCEDURE — 81002 URINALYSIS NONAUTO W/O SCOPE: CPT | Performed by: STUDENT IN AN ORGANIZED HEALTH CARE EDUCATION/TRAINING PROGRAM

## 2021-11-01 PROCEDURE — 3008F BODY MASS INDEX DOCD: CPT | Performed by: STUDENT IN AN ORGANIZED HEALTH CARE EDUCATION/TRAINING PROGRAM

## 2021-11-01 PROCEDURE — 3074F SYST BP LT 130 MM HG: CPT | Performed by: STUDENT IN AN ORGANIZED HEALTH CARE EDUCATION/TRAINING PROGRAM

## 2021-11-01 PROCEDURE — 3078F DIAST BP <80 MM HG: CPT | Performed by: STUDENT IN AN ORGANIZED HEALTH CARE EDUCATION/TRAINING PROGRAM

## 2021-11-01 PROCEDURE — 90471 IMMUNIZATION ADMIN: CPT | Performed by: STUDENT IN AN ORGANIZED HEALTH CARE EDUCATION/TRAINING PROGRAM

## 2021-11-01 RX ORDER — CYCLOBENZAPRINE HCL 5 MG
5 TABLET ORAL 3 TIMES DAILY PRN
Qty: 45 TABLET | Refills: 0 | Status: SHIPPED | OUTPATIENT
Start: 2021-11-01 | End: 2021-11-15

## 2021-11-01 NOTE — PROGRESS NOTES
Back pain & R leg pain as below. Also not feeling consistent fetal movement throughout day. Usually feels after lunch. But worried that people tell her she should be feeling lots of movement by now.  Discussed kick counts in quiet place without distractio

## 2021-11-03 ENCOUNTER — APPOINTMENT (OUTPATIENT)
Dept: PHYSICAL THERAPY | Facility: HOSPITAL | Age: 31
End: 2021-11-03
Payer: COMMERCIAL

## 2021-11-03 ENCOUNTER — TELEPHONE (OUTPATIENT)
Dept: PHYSICAL THERAPY | Facility: HOSPITAL | Age: 31
End: 2021-11-03

## 2021-11-04 ENCOUNTER — OFFICE VISIT (OUTPATIENT)
Dept: PHYSICAL THERAPY | Facility: HOSPITAL | Age: 31
End: 2021-11-04
Attending: FAMILY MEDICINE
Payer: COMMERCIAL

## 2021-11-04 PROCEDURE — 97112 NEUROMUSCULAR REEDUCATION: CPT

## 2021-11-04 PROCEDURE — 97110 THERAPEUTIC EXERCISES: CPT

## 2021-11-04 NOTE — PROGRESS NOTES
Discharge Summary  Pt has attended 4 visits in Physical Therapy.       Dx: 24 weeks gestation of pregnancy (Z3A.24)  Neck pain on right side (M54.2)  Recurrent low back pain (M54.50)         Authorized # of Visits:  No limit, no auth, POC 12         Next M 5-/5  Lats: R 5-/5 (WAS:4/5), L 5-/5  Low trap: R 5-/5 (WAS:4/5); L 5-/5     Strength: R 45 LBS (WAS:35) lbs; L 45 lbs    R handed Hip flexion (L2): B 5-/5 (WAS:R 4-/5; L 4+/5)  Hip abduction: B 5-/5 (WAS:R 4-/5; L 4+/5)  Hip Extension: B 5-/5 (WAS:R 4 transferring with </=2/10 pain -MET  · Pt will improve cervical AROM rotation to >/=70 degrees to improve tolerance for turning head to check blind spot while driving-MET  · Pt will improve postural strength (mid/low trap) to 5-/5 to promote improved uprig x20    Sitting-  Roll ball FW 3 ways x3    Upper trap stretch 20 sec R/L x2    Sitting-  R/L knee ext + ankle DF x10 ea scap retraction + row RTB w  Chin tuck and neutral posture x20    Standing-  Kegel + iso hip add + squat w ball behind back x20

## 2021-11-14 ENCOUNTER — HOSPITAL ENCOUNTER (OUTPATIENT)
Facility: HOSPITAL | Age: 31
Setting detail: OBSERVATION
Discharge: HOME | End: 2021-11-14
Attending: OBSTETRICS & GYNECOLOGY | Admitting: OBSTETRICS & GYNECOLOGY
Payer: COMMERCIAL

## 2021-11-14 VITALS
DIASTOLIC BLOOD PRESSURE: 65 MMHG | SYSTOLIC BLOOD PRESSURE: 123 MMHG | WEIGHT: 219 LBS | BODY MASS INDEX: 35.2 KG/M2 | HEART RATE: 81 BPM | TEMPERATURE: 98 F | HEIGHT: 66 IN

## 2021-11-14 PROCEDURE — 59025 FETAL NON-STRESS TEST: CPT

## 2021-11-14 PROCEDURE — 99212 OFFICE O/P EST SF 10 MIN: CPT

## 2021-11-14 NOTE — PROGRESS NOTES
Pt given both written and verbal discharge instructions. Questions answered. Pt verbalized understanding. Pt discharged home with spouse in stable condition.

## 2021-11-14 NOTE — PROGRESS NOTES
27year old  at 30+4 weeks gestation presents to triage stating that she took her blood pressure at home earlier and it was elevated. Pt also states that she is having some sharp back pain lower middle back.   Pt has had some sciatic issues with this p

## 2021-11-15 ENCOUNTER — ROUTINE PRENATAL (OUTPATIENT)
Dept: OBGYN CLINIC | Facility: CLINIC | Age: 31
End: 2021-11-15
Payer: COMMERCIAL

## 2021-11-15 VITALS
WEIGHT: 223 LBS | BODY MASS INDEX: 35.84 KG/M2 | SYSTOLIC BLOOD PRESSURE: 118 MMHG | DIASTOLIC BLOOD PRESSURE: 78 MMHG | HEART RATE: 83 BPM | HEIGHT: 66 IN

## 2021-11-15 DIAGNOSIS — Z34.93 ENCOUNTER FOR SUPERVISION OF NORMAL PREGNANCY IN THIRD TRIMESTER, UNSPECIFIED GRAVIDITY: Primary | ICD-10-CM

## 2021-11-15 PROCEDURE — 3008F BODY MASS INDEX DOCD: CPT | Performed by: STUDENT IN AN ORGANIZED HEALTH CARE EDUCATION/TRAINING PROGRAM

## 2021-11-15 PROCEDURE — 81002 URINALYSIS NONAUTO W/O SCOPE: CPT | Performed by: STUDENT IN AN ORGANIZED HEALTH CARE EDUCATION/TRAINING PROGRAM

## 2021-11-15 PROCEDURE — 3074F SYST BP LT 130 MM HG: CPT | Performed by: STUDENT IN AN ORGANIZED HEALTH CARE EDUCATION/TRAINING PROGRAM

## 2021-11-15 PROCEDURE — 3078F DIAST BP <80 MM HG: CPT | Performed by: STUDENT IN AN ORGANIZED HEALTH CARE EDUCATION/TRAINING PROGRAM

## 2021-11-15 RX ORDER — SWAB
SWAB, NON-MEDICATED MISCELLANEOUS
COMMUNITY

## 2021-11-15 NOTE — PROGRESS NOTES
LALO - 30w5d   Back pain is pretty bad sometimes despite measures below. Actually went to triage 11/14 because took her blood pressure at home when she was in pain and says was elevated.  BP normal in triage.    - HIV neg, Tdap done  - failed 1 GTT, normal 3

## 2021-12-03 ENCOUNTER — ULTRASOUND ENCOUNTER (OUTPATIENT)
Dept: OBGYN CLINIC | Facility: CLINIC | Age: 31
End: 2021-12-03
Payer: COMMERCIAL

## 2021-12-03 ENCOUNTER — ROUTINE PRENATAL (OUTPATIENT)
Dept: OBGYN CLINIC | Facility: CLINIC | Age: 31
End: 2021-12-03
Payer: COMMERCIAL

## 2021-12-03 VITALS
HEART RATE: 81 BPM | BODY MASS INDEX: 36.64 KG/M2 | DIASTOLIC BLOOD PRESSURE: 78 MMHG | HEIGHT: 66 IN | WEIGHT: 228 LBS | SYSTOLIC BLOOD PRESSURE: 120 MMHG

## 2021-12-03 DIAGNOSIS — Z34.93 ENCOUNTER FOR SUPERVISION OF NORMAL PREGNANCY IN THIRD TRIMESTER, UNSPECIFIED GRAVIDITY: Primary | ICD-10-CM

## 2021-12-03 DIAGNOSIS — O99.213 OBESITY AFFECTING PREGNANCY IN THIRD TRIMESTER: ICD-10-CM

## 2021-12-03 PROCEDURE — 3078F DIAST BP <80 MM HG: CPT | Performed by: STUDENT IN AN ORGANIZED HEALTH CARE EDUCATION/TRAINING PROGRAM

## 2021-12-03 PROCEDURE — 3008F BODY MASS INDEX DOCD: CPT | Performed by: STUDENT IN AN ORGANIZED HEALTH CARE EDUCATION/TRAINING PROGRAM

## 2021-12-03 PROCEDURE — 3074F SYST BP LT 130 MM HG: CPT | Performed by: STUDENT IN AN ORGANIZED HEALTH CARE EDUCATION/TRAINING PROGRAM

## 2021-12-03 PROCEDURE — 81002 URINALYSIS NONAUTO W/O SCOPE: CPT | Performed by: STUDENT IN AN ORGANIZED HEALTH CARE EDUCATION/TRAINING PROGRAM

## 2021-12-03 PROCEDURE — 76816 OB US FOLLOW-UP PER FETUS: CPT | Performed by: STUDENT IN AN ORGANIZED HEALTH CARE EDUCATION/TRAINING PROGRAM

## 2021-12-03 NOTE — PROGRESS NOTES
LALO - 33w2d   Back pain is actually getting a bit better with stretching regularly. Baby very active. Looking into COVID booster.    - HIV neg, Tdap done, flu shot done  - failed 1 GTT, normal 3h GTT.  Hgb 12.1  - normal 20 week ultrasound with MFM  - cfDNA

## 2021-12-10 ENCOUNTER — HOSPITAL ENCOUNTER (OUTPATIENT)
Dept: ULTRASOUND IMAGING | Facility: HOSPITAL | Age: 31
Discharge: HOME | End: 2021-12-10
Attending: STUDENT IN AN ORGANIZED HEALTH CARE EDUCATION/TRAINING PROGRAM
Payer: COMMERCIAL

## 2021-12-10 ENCOUNTER — TELEPHONE (OUTPATIENT)
Dept: FAMILY MEDICINE CLINIC | Facility: CLINIC | Age: 31
End: 2021-12-10

## 2021-12-10 ENCOUNTER — ROUTINE PRENATAL (OUTPATIENT)
Dept: OBGYN CLINIC | Facility: CLINIC | Age: 31
End: 2021-12-10
Payer: COMMERCIAL

## 2021-12-10 VITALS
HEART RATE: 80 BPM | WEIGHT: 230.19 LBS | HEIGHT: 66 IN | DIASTOLIC BLOOD PRESSURE: 72 MMHG | BODY MASS INDEX: 36.99 KG/M2 | SYSTOLIC BLOOD PRESSURE: 126 MMHG

## 2021-12-10 DIAGNOSIS — L53.9 LEG ERYTHEMA: ICD-10-CM

## 2021-12-10 DIAGNOSIS — M79.89 LEFT LEG SWELLING: Primary | ICD-10-CM

## 2021-12-10 DIAGNOSIS — Z34.93 ENCOUNTER FOR SUPERVISION OF NORMAL PREGNANCY IN THIRD TRIMESTER, UNSPECIFIED GRAVIDITY: ICD-10-CM

## 2021-12-10 DIAGNOSIS — M79.89 LEFT LEG SWELLING: ICD-10-CM

## 2021-12-10 PROCEDURE — 3008F BODY MASS INDEX DOCD: CPT | Performed by: STUDENT IN AN ORGANIZED HEALTH CARE EDUCATION/TRAINING PROGRAM

## 2021-12-10 PROCEDURE — 3078F DIAST BP <80 MM HG: CPT | Performed by: STUDENT IN AN ORGANIZED HEALTH CARE EDUCATION/TRAINING PROGRAM

## 2021-12-10 PROCEDURE — 3074F SYST BP LT 130 MM HG: CPT | Performed by: STUDENT IN AN ORGANIZED HEALTH CARE EDUCATION/TRAINING PROGRAM

## 2021-12-10 PROCEDURE — 93971 EXTREMITY STUDY: CPT | Performed by: STUDENT IN AN ORGANIZED HEALTH CARE EDUCATION/TRAINING PROGRAM

## 2021-12-10 PROCEDURE — 99214 OFFICE O/P EST MOD 30 MIN: CPT | Performed by: STUDENT IN AN ORGANIZED HEALTH CARE EDUCATION/TRAINING PROGRAM

## 2021-12-10 PROCEDURE — 81002 URINALYSIS NONAUTO W/O SCOPE: CPT | Performed by: STUDENT IN AN ORGANIZED HEALTH CARE EDUCATION/TRAINING PROGRAM

## 2021-12-10 NOTE — TELEPHONE ENCOUNTER
Patient agrees to get evaluation at urgent care. She also made appointment with OB to see her, but I told her UC would be better so they can do US on the spot.

## 2021-12-10 NOTE — TELEPHONE ENCOUNTER
Patient would like appointment today to evaluate for leg pain that feels like a blood clot. Please call to get more information to determine if patient should be evaluated in the ER. Thank you.

## 2021-12-10 NOTE — TELEPHONE ENCOUNTER
Please have the patient go to urgent care for evaluation. She will need ultrasound on the can do it over there.   Thank

## 2021-12-10 NOTE — PROGRESS NOTES
OB problem visit - 34w2d   Woke up this AM with new swelling and red area on L calf. Is a bit tender. No trauma to this area of leg.  Called PCP and was advised to go to urgent care for US to rule out DVT  Does also have worsening sciatica-type pain on L no

## 2021-12-21 ENCOUNTER — ROUTINE PRENATAL (OUTPATIENT)
Dept: OBGYN CLINIC | Facility: CLINIC | Age: 31
End: 2021-12-21
Payer: COMMERCIAL

## 2021-12-21 VITALS
WEIGHT: 232.38 LBS | BODY MASS INDEX: 37.35 KG/M2 | HEIGHT: 66 IN | DIASTOLIC BLOOD PRESSURE: 70 MMHG | SYSTOLIC BLOOD PRESSURE: 122 MMHG | HEART RATE: 76 BPM

## 2021-12-21 DIAGNOSIS — O99.212 OBESITY AFFECTING PREGNANCY IN SECOND TRIMESTER: ICD-10-CM

## 2021-12-21 DIAGNOSIS — Z34.93 ENCOUNTER FOR SUPERVISION OF NORMAL PREGNANCY IN THIRD TRIMESTER, UNSPECIFIED GRAVIDITY: Primary | ICD-10-CM

## 2021-12-21 PROCEDURE — 87081 CULTURE SCREEN ONLY: CPT | Performed by: OBSTETRICS & GYNECOLOGY

## 2021-12-21 PROCEDURE — 87653 STREP B DNA AMP PROBE: CPT | Performed by: OBSTETRICS & GYNECOLOGY

## 2021-12-21 PROCEDURE — 3008F BODY MASS INDEX DOCD: CPT | Performed by: OBSTETRICS & GYNECOLOGY

## 2021-12-21 PROCEDURE — 81002 URINALYSIS NONAUTO W/O SCOPE: CPT | Performed by: OBSTETRICS & GYNECOLOGY

## 2021-12-21 PROCEDURE — 3078F DIAST BP <80 MM HG: CPT | Performed by: OBSTETRICS & GYNECOLOGY

## 2021-12-21 PROCEDURE — 3074F SYST BP LT 130 MM HG: CPT | Performed by: OBSTETRICS & GYNECOLOGY

## 2021-12-21 NOTE — PROGRESS NOTES
Has a car seat she knows she needs a pediatrician. Flu shot and Covid booster is been done. Beta strep. NST next visit.

## 2021-12-30 ENCOUNTER — ROUTINE PRENATAL (OUTPATIENT)
Dept: OBGYN CLINIC | Facility: CLINIC | Age: 31
End: 2021-12-30
Payer: COMMERCIAL

## 2021-12-30 ENCOUNTER — HOSPITAL ENCOUNTER (OUTPATIENT)
Dept: ULTRASOUND IMAGING | Age: 31
Discharge: HOME | End: 2021-12-30
Attending: OBSTETRICS & GYNECOLOGY
Payer: COMMERCIAL

## 2021-12-30 VITALS
WEIGHT: 235.63 LBS | DIASTOLIC BLOOD PRESSURE: 60 MMHG | BODY MASS INDEX: 37.87 KG/M2 | HEART RATE: 96 BPM | SYSTOLIC BLOOD PRESSURE: 112 MMHG | HEIGHT: 66 IN

## 2021-12-30 DIAGNOSIS — M54.50 LOW BACK PAIN DURING PREGNANCY IN THIRD TRIMESTER: ICD-10-CM

## 2021-12-30 DIAGNOSIS — O99.810 ABNORMAL GLUCOSE TOLERANCE TEST (GTT) DURING PREGNANCY, ANTEPARTUM: ICD-10-CM

## 2021-12-30 DIAGNOSIS — O26.03 EXCESSIVE WEIGHT GAIN DURING PREGNANCY IN THIRD TRIMESTER: ICD-10-CM

## 2021-12-30 DIAGNOSIS — O99.213 OBESITY AFFECTING PREGNANCY IN THIRD TRIMESTER: Primary | ICD-10-CM

## 2021-12-30 DIAGNOSIS — Z34.03 PREGNANCY, FIRST, THIRD TRIMESTER: ICD-10-CM

## 2021-12-30 DIAGNOSIS — O26.893 LOW BACK PAIN DURING PREGNANCY IN THIRD TRIMESTER: ICD-10-CM

## 2021-12-30 DIAGNOSIS — E03.9 HYPOTHYROIDISM AFFECTING PREGNANCY IN THIRD TRIMESTER: ICD-10-CM

## 2021-12-30 DIAGNOSIS — O99.213 OBESITY AFFECTING PREGNANCY IN THIRD TRIMESTER: ICD-10-CM

## 2021-12-30 DIAGNOSIS — O99.283 HYPOTHYROIDISM AFFECTING PREGNANCY IN THIRD TRIMESTER: ICD-10-CM

## 2021-12-30 PROCEDURE — 59025 FETAL NON-STRESS TEST: CPT | Performed by: OBSTETRICS & GYNECOLOGY

## 2021-12-30 PROCEDURE — 3078F DIAST BP <80 MM HG: CPT | Performed by: OBSTETRICS & GYNECOLOGY

## 2021-12-30 PROCEDURE — 81002 URINALYSIS NONAUTO W/O SCOPE: CPT | Performed by: OBSTETRICS & GYNECOLOGY

## 2021-12-30 PROCEDURE — 3074F SYST BP LT 130 MM HG: CPT | Performed by: OBSTETRICS & GYNECOLOGY

## 2021-12-30 PROCEDURE — 3008F BODY MASS INDEX DOCD: CPT | Performed by: OBSTETRICS & GYNECOLOGY

## 2021-12-30 PROCEDURE — 76816 OB US FOLLOW-UP PER FETUS: CPT | Performed by: OBSTETRICS & GYNECOLOGY

## 2021-12-30 NOTE — PATIENT INSTRUCTIONS
EXCESSIVE WEIGHT GAIN  Gaining too much weight increases the risk of a large fetus.  A larger fetus places itself and the mother at risk for injury during birth and increases the risk that it will not be able to descend through the pelvis, making a

## 2021-12-30 NOTE — PROGRESS NOTES
LALO    +FM. No contractions, leaking fluid, vaginal bleeding  +Occasional headache - usually if not eating consistency - eating helps  No vision changes, epigastric pain.       27year old  at 37w1d   MICHAEL 22  B+/GBS neg    - COVID-19 vaccine done &

## 2021-12-31 ENCOUNTER — TELEPHONE (OUTPATIENT)
Dept: OBGYN CLINIC | Facility: CLINIC | Age: 31
End: 2021-12-31

## 2021-12-31 DIAGNOSIS — Z01.812 PRE-PROCEDURAL LABORATORY EXAMINATION: Primary | ICD-10-CM

## 2021-12-31 NOTE — TELEPHONE ENCOUNTER
Patient aware of IOL date and time of 1/12/22 at 8:30 pm. Covid test order placed and scheduling instructions given. Understanding verbalized. Calendars updated.

## 2021-12-31 NOTE — TELEPHONE ENCOUNTER
Anitha Hanson MD  [facility] [address] Staff  Please schedule cytotec PM induction of labor for obesity, excessive weight gain. Thanks!

## 2022-01-04 ENCOUNTER — TELEPHONE (OUTPATIENT)
Dept: OBGYN CLINIC | Facility: CLINIC | Age: 32
End: 2022-01-04

## 2022-01-04 NOTE — TELEPHONE ENCOUNTER
Pt concern her pain (LLQ) is still there. Advised pt to go to ER to be evaluated. Pt. Neftali Luo. Understanding.

## 2022-01-04 NOTE — TELEPHONE ENCOUNTER
37 6/7 wks G1PO c/o LLQ abd sharp pain, onset 0530, pain is 8/10, took warm bath. FM+, denies uc(cramping/tighening abd),  bleeding, leaking, constipation, recent intercourse. Enc.  To increase fluid intake up to [place], may take Tylenol for pain

## 2022-01-05 ENCOUNTER — TELEPHONE (OUTPATIENT)
Dept: OBGYN CLINIC | Facility: CLINIC | Age: 32
End: 2022-01-05

## 2022-01-05 NOTE — TELEPHONE ENCOUNTER
C/O decreased FM. Last felt baby moved was a few hours ago. Usually active in am. Only felt move once. Pt. Drank and ate something sweet, walked around, no movement felt    UC x 3 since in am, denies  bleeding leaking.      Advised to be evaluated in

## 2022-01-07 ENCOUNTER — ROUTINE PRENATAL (OUTPATIENT)
Dept: OBGYN CLINIC | Facility: CLINIC | Age: 32
End: 2022-01-07
Payer: COMMERCIAL

## 2022-01-07 ENCOUNTER — HOSPITAL ENCOUNTER (OUTPATIENT)
Facility: HOSPITAL | Age: 32
Discharge: HOME | End: 2022-01-07
Attending: OBSTETRICS & GYNECOLOGY | Admitting: OBSTETRICS & GYNECOLOGY
Payer: COMMERCIAL

## 2022-01-07 VITALS
HEART RATE: 96 BPM | BODY MASS INDEX: 38.89 KG/M2 | HEIGHT: 66 IN | SYSTOLIC BLOOD PRESSURE: 118 MMHG | DIASTOLIC BLOOD PRESSURE: 74 MMHG | WEIGHT: 242 LBS

## 2022-01-07 DIAGNOSIS — Z34.93 ENCOUNTER FOR SUPERVISION OF NORMAL PREGNANCY IN THIRD TRIMESTER, UNSPECIFIED GRAVIDITY: Primary | ICD-10-CM

## 2022-01-07 DIAGNOSIS — O99.213 OBESITY AFFECTING PREGNANCY IN THIRD TRIMESTER: ICD-10-CM

## 2022-01-07 PROCEDURE — 3078F DIAST BP <80 MM HG: CPT | Performed by: STUDENT IN AN ORGANIZED HEALTH CARE EDUCATION/TRAINING PROGRAM

## 2022-01-07 PROCEDURE — 59025 FETAL NON-STRESS TEST: CPT

## 2022-01-07 PROCEDURE — 99213 OFFICE O/P EST LOW 20 MIN: CPT

## 2022-01-07 PROCEDURE — 3008F BODY MASS INDEX DOCD: CPT | Performed by: STUDENT IN AN ORGANIZED HEALTH CARE EDUCATION/TRAINING PROGRAM

## 2022-01-07 PROCEDURE — 59025 FETAL NON-STRESS TEST: CPT | Performed by: STUDENT IN AN ORGANIZED HEALTH CARE EDUCATION/TRAINING PROGRAM

## 2022-01-07 PROCEDURE — 81002 URINALYSIS NONAUTO W/O SCOPE: CPT | Performed by: STUDENT IN AN ORGANIZED HEALTH CARE EDUCATION/TRAINING PROGRAM

## 2022-01-07 PROCEDURE — 3074F SYST BP LT 130 MM HG: CPT | Performed by: STUDENT IN AN ORGANIZED HEALTH CARE EDUCATION/TRAINING PROGRAM

## 2022-01-07 NOTE — PROGRESS NOTES
Pt is a 27year old female admitted to [facility]. Patient presents with:  Non-stress Test     Pt is  38w2d intra-uterine pregnancy. History obtained, consents signed. Oriented to room, staff, and plan of care.     Pt was sent in from office with no

## 2022-01-07 NOTE — NST
Nonstress Test   Patient: Devon Bianchi    Gestation: 38w2d    NST:       Variability: Moderate           Accelerations: Yes           Decelerations: None            Baseline: 135 BPM           Uterine Irritability: Yes

## 2022-01-07 NOTE — PROGRESS NOTES
Random pelvic pains, more leg swelling.  Baby's movements feel different, less dramatic but does still feel movement  NST today nonreactive - sent to L&D for BPP, poss delivery  Cervix today closed    27year old  at 38w2d   MICHAEL 22  B+/GBS neg    -

## 2022-01-09 ENCOUNTER — LAB ENCOUNTER (OUTPATIENT)
Dept: LAB | Facility: HOSPITAL | Age: 32
End: 2022-01-09
Attending: STUDENT IN AN ORGANIZED HEALTH CARE EDUCATION/TRAINING PROGRAM
Payer: COMMERCIAL

## 2022-01-09 DIAGNOSIS — Z01.812 PRE-PROCEDURAL LABORATORY EXAMINATION: ICD-10-CM

## 2022-01-11 LAB — SARS-COV-2 RNA RESP QL NAA+PROBE: NOT DETECTED

## 2022-01-12 ENCOUNTER — HOSPITAL ENCOUNTER (INPATIENT)
Facility: HOSPITAL | Age: 32
LOS: 3 days | Discharge: HOME | End: 2022-01-15
Attending: OBSTETRICS & GYNECOLOGY | Admitting: OBSTETRICS & GYNECOLOGY
Payer: COMMERCIAL

## 2022-01-12 ENCOUNTER — TELEPHONE (OUTPATIENT)
Dept: OBGYN CLINIC | Facility: CLINIC | Age: 32
End: 2022-01-12

## 2022-01-12 ENCOUNTER — APPOINTMENT (OUTPATIENT)
Dept: OBGYN CLINIC | Facility: HOSPITAL | Age: 32
End: 2022-01-12
Payer: COMMERCIAL

## 2022-01-12 LAB
ANTIBODY SCREEN: NEGATIVE
BASOPHILS # BLD: 0 X10(3) UL (ref 0–0.2)
BASOPHILS NFR BLD: 0 %
EOSINOPHIL # BLD: 0 X10(3) UL (ref 0–0.7)
EOSINOPHIL NFR BLD: 0 %
ERYTHROCYTE [DISTWIDTH] IN BLOOD BY AUTOMATED COUNT: 13.9 %
HCT VFR BLD AUTO: 37.4 %
HGB BLD-MCNC: 12.4 G/DL
LYMPHOCYTES NFR BLD: 18 %
LYMPHOCYTES NFR BLD: 2.97 X10(3) UL (ref 1–4)
MCH RBC QN AUTO: 29.2 PG (ref 26–34)
MCHC RBC AUTO-ENTMCNC: 33.2 G/DL (ref 31–37)
MCV RBC AUTO: 88 FL
METAMYELOCYTES # BLD: 0.17 X10(3) UL
METAMYELOCYTES NFR BLD: 1 %
MONOCYTES # BLD: 0.66 X10(3) UL (ref 0.1–1)
MONOCYTES NFR BLD: 4 %
MORPHOLOGY: NORMAL
NEUTROPHILS # BLD AUTO: 11.76 X10 (3) UL (ref 1.5–7.7)
NEUTROPHILS NFR BLD: 77 %
NEUTS HYPERSEG # BLD: 12.71 X10(3) UL (ref 1.5–7.7)
PLATELET # BLD AUTO: 256 10(3)UL (ref 150–450)
PLATELET MORPHOLOGY: NORMAL
RBC # BLD AUTO: 4.25 X10(6)UL
RH BLOOD TYPE: POSITIVE
T PALLIDUM AB SER QL IA: NONREACTIVE
TOTAL CELLS COUNTED BLD: 100
WBC # BLD AUTO: 16.5 X10(3) UL (ref 4–11)

## 2022-01-12 PROCEDURE — 3E0P7VZ INTRODUCTION OF HORMONE INTO FEMALE REPRODUCTIVE, VIA NATURAL OR ARTIFICIAL OPENING: ICD-10-PCS | Performed by: STUDENT IN AN ORGANIZED HEALTH CARE EDUCATION/TRAINING PROGRAM

## 2022-01-12 RX ORDER — SODIUM CHLORIDE, SODIUM LACTATE, POTASSIUM CHLORIDE, CALCIUM CHLORIDE 600; 310; 30; 20 MG/100ML; MG/100ML; MG/100ML; MG/100ML
INJECTION, SOLUTION INTRAVENOUS CONTINUOUS
Status: DISCONTINUED | OUTPATIENT
Start: 2022-01-12 | End: 2022-01-13

## 2022-01-12 RX ORDER — IBUPROFEN 600 MG/1
600 TABLET ORAL EVERY 6 HOURS PRN
Status: DISCONTINUED | OUTPATIENT
Start: 2022-01-12 | End: 2022-01-13

## 2022-01-12 RX ORDER — DEXTROSE, SODIUM CHLORIDE, SODIUM LACTATE, POTASSIUM CHLORIDE, AND CALCIUM CHLORIDE 5; .6; .31; .03; .02 G/100ML; G/100ML; G/100ML; G/100ML; G/100ML
INJECTION, SOLUTION INTRAVENOUS AS NEEDED
Status: DISCONTINUED | OUTPATIENT
Start: 2022-01-12 | End: 2022-01-13

## 2022-01-12 RX ORDER — ONDANSETRON 2 MG/ML
4 INJECTION INTRAMUSCULAR; INTRAVENOUS EVERY 6 HOURS PRN
Status: DISCONTINUED | OUTPATIENT
Start: 2022-01-12 | End: 2022-01-13

## 2022-01-12 RX ORDER — TRISODIUM CITRATE DIHYDRATE AND CITRIC ACID MONOHYDRATE 500; 334 MG/5ML; MG/5ML
30 SOLUTION ORAL AS NEEDED
Status: DISCONTINUED | OUTPATIENT
Start: 2022-01-12 | End: 2022-01-13

## 2022-01-12 RX ORDER — ACETAMINOPHEN 500 MG
500 TABLET ORAL EVERY 6 HOURS PRN
Status: DISCONTINUED | OUTPATIENT
Start: 2022-01-12 | End: 2022-01-13

## 2022-01-12 RX ORDER — TERBUTALINE SULFATE 1 MG/ML
0.25 INJECTION, SOLUTION SUBCUTANEOUS AS NEEDED
Status: DISCONTINUED | OUTPATIENT
Start: 2022-01-12 | End: 2022-01-13

## 2022-01-12 RX ORDER — AMMONIA INHALANTS 0.04 G/.3ML
0.3 INHALANT RESPIRATORY (INHALATION) AS NEEDED
Status: DISCONTINUED | OUTPATIENT
Start: 2022-01-12 | End: 2022-01-13

## 2022-01-12 NOTE — TELEPHONE ENCOUNTER
On call physician    27year old  at 39w0d    Paged c/o constant low back pain throughout the night. Not waxing & waning or coming & going.      Mild low pelvic cramping started just a few min ago, so not sure if having contractions and whether there is

## 2022-01-13 ENCOUNTER — ANESTHESIA (OUTPATIENT)
Dept: OBGYN UNIT | Facility: HOSPITAL | Age: 32
End: 2022-01-13
Payer: COMMERCIAL

## 2022-01-13 ENCOUNTER — ANESTHESIA EVENT (OUTPATIENT)
Dept: OBGYN UNIT | Facility: HOSPITAL | Age: 32
End: 2022-01-13
Payer: COMMERCIAL

## 2022-01-13 PROCEDURE — 0KQM0ZZ REPAIR PERINEUM MUSCLE, OPEN APPROACH: ICD-10-PCS | Performed by: STUDENT IN AN ORGANIZED HEALTH CARE EDUCATION/TRAINING PROGRAM

## 2022-01-13 PROCEDURE — 59400 OBSTETRICAL CARE: CPT | Performed by: STUDENT IN AN ORGANIZED HEALTH CARE EDUCATION/TRAINING PROGRAM

## 2022-01-13 PROCEDURE — 10907ZC DRAINAGE OF AMNIOTIC FLUID, THERAPEUTIC FROM PRODUCTS OF CONCEPTION, VIA NATURAL OR ARTIFICIAL OPENING: ICD-10-PCS | Performed by: STUDENT IN AN ORGANIZED HEALTH CARE EDUCATION/TRAINING PROGRAM

## 2022-01-13 RX ORDER — DOCUSATE SODIUM 100 MG/1
100 CAPSULE, LIQUID FILLED ORAL
Status: DISCONTINUED | OUTPATIENT
Start: 2022-01-13 | End: 2022-01-15

## 2022-01-13 RX ORDER — NALBUPHINE HCL 10 MG/ML
2.5 AMPUL (ML) INJECTION
Status: DISCONTINUED | OUTPATIENT
Start: 2022-01-13 | End: 2022-01-13

## 2022-01-13 RX ORDER — CALCIUM CARBONATE 200(500)MG
1000 TABLET,CHEWABLE ORAL
Status: DISCONTINUED | OUTPATIENT
Start: 2022-01-13 | End: 2022-01-13

## 2022-01-13 RX ORDER — CHOLECALCIFEROL (VITAMIN D3) 25 MCG
1 TABLET,CHEWABLE ORAL DAILY
Status: DISCONTINUED | OUTPATIENT
Start: 2022-01-13 | End: 2022-01-15

## 2022-01-13 RX ORDER — ACETAMINOPHEN 500 MG
1000 TABLET ORAL EVERY 6 HOURS PRN
Status: DISCONTINUED | OUTPATIENT
Start: 2022-01-13 | End: 2022-01-15

## 2022-01-13 RX ORDER — BISACODYL 10 MG
10 SUPPOSITORY, RECTAL RECTAL ONCE AS NEEDED
Status: DISCONTINUED | OUTPATIENT
Start: 2022-01-13 | End: 2022-01-15

## 2022-01-13 RX ORDER — BUPIVACAINE HCL/0.9 % NACL/PF 0.25 %
5 PLASTIC BAG, INJECTION (ML) EPIDURAL AS NEEDED
Status: DISCONTINUED | OUTPATIENT
Start: 2022-01-13 | End: 2022-01-13

## 2022-01-13 RX ORDER — SIMETHICONE 80 MG
80 TABLET,CHEWABLE ORAL 3 TIMES DAILY PRN
Status: DISCONTINUED | OUTPATIENT
Start: 2022-01-13 | End: 2022-01-15

## 2022-01-13 RX ORDER — IBUPROFEN 600 MG/1
600 TABLET ORAL EVERY 6 HOURS
Status: DISCONTINUED | OUTPATIENT
Start: 2022-01-13 | End: 2022-01-15

## 2022-01-13 RX ORDER — LIDOCAINE HYDROCHLORIDE AND EPINEPHRINE 15; 5 MG/ML; UG/ML
INJECTION, SOLUTION EPIDURAL AS NEEDED
Status: DISCONTINUED | OUTPATIENT
Start: 2022-01-13 | End: 2022-01-13 | Stop reason: SURG

## 2022-01-13 RX ORDER — HYDROMORPHONE HYDROCHLORIDE 1 MG/ML
1 INJECTION, SOLUTION INTRAMUSCULAR; INTRAVENOUS; SUBCUTANEOUS EVERY 2 HOUR PRN
Status: DISCONTINUED | OUTPATIENT
Start: 2022-01-13 | End: 2022-01-13

## 2022-01-13 RX ORDER — LEVOTHYROXINE SODIUM 0.12 MG/1
62.5 TABLET ORAL
Status: DISCONTINUED | OUTPATIENT
Start: 2022-01-14 | End: 2022-01-15

## 2022-01-13 RX ADMIN — LIDOCAINE HYDROCHLORIDE AND EPINEPHRINE 5 ML: 15; 5 INJECTION, SOLUTION EPIDURAL at 11:41:00

## 2022-01-13 NOTE — ANESTHESIA PROCEDURE NOTES
Labor Analgesia  Performed by: Cholo Rubi MD  Authorized by: Cholo Rubi MD       General Information and Staff    Start Time:  1/13/2022 11:31 AM  End Time:  1/13/2022 11:41 AM  Anesthesiologist:  Cholo Rubi MD  Performed by:   Anesthesiologist

## 2022-01-13 NOTE — PROGRESS NOTES
Pt is a 27year old female admitted to [facility]. Patient presents with:  Scheduled Induction     Pt is  39w0d intra-uterine pregnancy. History obtained, consents signed. Oriented to room, staff, and plan of care.

## 2022-01-13 NOTE — ANESTHESIA PREPROCEDURE EVALUATION
PRE-OP EVALUATION    Patient Name: Cynthia Ham    Admit Diagnosis: Preg State   Pregnancy    Pre-op Diagnosis: * No surgery found *        Anesthesia Procedure: LABOR ANALGESIA    * Surgery not found *    Pre-op vitals reviewed.   Temp: 97.7 °F (36.5 °C) EC, Take 325 mg by mouth daily with breakfast., Disp: , Rfl: , 1/12/2022 at Unknown time  LEVOTHYROXINE SODIUM OR, Take 62.5 mcg by mouth.  , Disp: , Rfl: , 1/12/2022 at Unknown time        Allergies: Patient has no known allergies.       Anesthesia Evaluat

## 2022-01-13 NOTE — H&P
[facility]  Obstetrics and Gynecology  History & Physical    Viviana  Patient Status:  Inpatient    1990 MRN [id number]   Location [address] [facility] Attending Bijal Meier, [facility] Day 1 PCP [facility] as needed. , Disp: , Rfl: , 1/11/2022 at Unknown time  ferrous sulfate 325 (65 FE) MG Oral Tab EC, Take 325 mg by mouth daily with breakfast., Disp: , Rfl: , 1/12/2022 at Unknown time  LEVOTHYROXINE SODIUM OR, Take 62.5 mcg by mouth.  , Disp: , Rfl: , 1/12/ detail. All questions answered, all appropriate consents will be signed at the Hospital. Admission is planned for today.  anticipated.     Hurtis Holstein, MD

## 2022-01-14 LAB
BASOPHILS # BLD AUTO: 0.06 X10(3) UL (ref 0–0.2)
BASOPHILS NFR BLD AUTO: 0.3 %
EOSINOPHIL # BLD AUTO: 0.03 X10(3) UL (ref 0–0.7)
EOSINOPHIL NFR BLD AUTO: 0.2 %
ERYTHROCYTE [DISTWIDTH] IN BLOOD BY AUTOMATED COUNT: 13.9 %
HCT VFR BLD AUTO: 33 %
HGB BLD-MCNC: 10.5 G/DL
IMM GRANULOCYTES # BLD AUTO: 0.23 X10(3) UL (ref 0–1)
IMM GRANULOCYTES NFR BLD: 1.2 %
LYMPHOCYTES # BLD AUTO: 2.35 X10(3) UL (ref 1–4)
LYMPHOCYTES NFR BLD AUTO: 12.1 %
MCH RBC QN AUTO: 28.3 PG (ref 26–34)
MCHC RBC AUTO-ENTMCNC: 31.8 G/DL (ref 31–37)
MCV RBC AUTO: 88.9 FL
MONOCYTES # BLD AUTO: 1.46 X10(3) UL (ref 0.1–1)
MONOCYTES NFR BLD AUTO: 7.5 %
NEUTROPHILS # BLD AUTO: 15.3 X10 (3) UL (ref 1.5–7.7)
NEUTROPHILS # BLD AUTO: 15.3 X10(3) UL (ref 1.5–7.7)
NEUTROPHILS NFR BLD AUTO: 78.7 %
PLATELET # BLD AUTO: 210 10(3)UL (ref 150–450)
RBC # BLD AUTO: 3.71 X10(6)UL
WBC # BLD AUTO: 19.4 X10(3) UL (ref 4–11)

## 2022-01-14 NOTE — PROGRESS NOTES
Labor Analgesia Follow Up Note    Patient underwent epidural anesthesia for labor analgesia,    Placenta Date/Time: 1/13/2022  8:49 PM    Delivery Date/Time[de-identified] 1/13/2022  8:42 PM    /70   Pulse 57   Temp 98.2 °F (36.8 °C) (Oral)   Resp 18   Ht 1.676 m

## 2022-01-14 NOTE — L&D DELIVERY NOTE
Gifty Damian [[id number]]    Labor Events     labor?: No  Cervical ripening type: Misoprostol  Antibiotics received during labor?: No  Antibiotics (enter # doses in comment): none  Rupture date/time: 2022 1100     Rupture type: AROM  Fluid color: Minute:  5 Minute:  10 Minute:  15 Minute:  20 Minute:    Skin color: 1  1       Heart rate: 2  2       Reflex irritablity: 2  2       Muscle tone: 2  2       Respiratory effort: 2  2       Total: 9  9          Apgars assigned by: SIMONE Dimas RN  [place] disp

## 2022-01-14 NOTE — PLAN OF CARE
Problem: BIRTH - VAGINAL/ SECTION  Goal: Fetal and maternal status remain reassuring during the birth process  Description: INTERVENTIONS:  - Monitor vital signs  - Monitor fetal heart rate  - Monitor uterine activity  - Monitor labor progression Mercedes Naranjo RN  Outcome: Completed  1/13/2022 2142 by Mercedes Naranjo RN  Outcome: Progressing  Goal: Patient/Family Short Term Goal  Description: Patient's Short Term Goal: adequate pain mangement    Interventions:     - See additional Care Pl

## 2022-01-14 NOTE — PLAN OF CARE
Problem: BIRTH - VAGINAL/ SECTION  Goal: Fetal and maternal status remain reassuring during the birth process  Description: INTERVENTIONS:  - Monitor vital signs  - Monitor fetal heart rate  - Monitor uterine activity  - Monitor labor progression frequently for physical needs  - Identify cognitive and physical deficits and behaviors that affect risk of falls.   - Colony fall precautions as indicated by assessment.  - Educate pt/family on patient safety including physical limitations  - Instruct p

## 2022-01-14 NOTE — PROGRESS NOTES
NURSING ADMISSION NOTE      Patient admitted via Wheelchair w/  and belongings   Oriented to room. Safety precautions initiated. Bed in low position. Call light in reach.   ID bands checked by 2 RNs

## 2022-01-15 VITALS
WEIGHT: 242 LBS | DIASTOLIC BLOOD PRESSURE: 76 MMHG | TEMPERATURE: 98 F | HEIGHT: 66 IN | RESPIRATION RATE: 18 BRPM | SYSTOLIC BLOOD PRESSURE: 116 MMHG | BODY MASS INDEX: 38.89 KG/M2 | HEART RATE: 65 BPM

## 2022-01-15 PROBLEM — Z34.90 PREGNANCY: Status: RESOLVED | Noted: 2021-09-28 | Resolved: 2022-01-15

## 2022-01-15 PROBLEM — Z34.90 PREGNANCY (HCC): Status: RESOLVED | Noted: 2021-09-28 | Resolved: 2022-01-15

## 2022-01-15 NOTE — PROGRESS NOTES
PPD#2    Pt has no complaints, lochia minimal. Tolerating PO, voiding without difficulty.      01/15/22  0800   BP: 116/76   Pulse: 65   Resp: 18   Temp: 98.2 °F (36.8 °C)     Recent Labs   Lab 01/12/22  2100 01/14/22  0417   RBC 4.25 3.71*   HGB 12.4 10.5* 04-Mar-2018 15:07

## 2022-01-15 NOTE — PROGRESS NOTES
Baby bottlefeeding well, and occas pumping, all dc teaching reviewed earlier and all questions answered. Pt states comfortable caring for self and baby. Discharged in stable condition with family and accompanied by staff at [phone] per ambulatory.

## 2022-01-15 NOTE — DISCHARGE SUMMARY
[facility]    Discharge Summary    Kitty Mckenzie Patient Status:  Inpatient    1990 MRN [id number]   [facility] 2SW-J Attending Donte Nieves, [address] [facility] Day # 3 PCP Miki Brito MD     Admit Date: 2022    Discharge D

## 2022-01-17 ENCOUNTER — PATIENT OUTREACH (OUTPATIENT)
Dept: CASE MANAGEMENT | Age: 32
End: 2022-01-17

## 2022-01-17 NOTE — PROGRESS NOTES
1st attempt OBGYN Post Partum apt request  (delivered 01/13)    Dr Tanya Dalton  [facility]  [address] [facility] [phone] [employer] [facility]  [phone]  Follow up 6 weeks  Apt made:  Fri 02/25 @10:00am  Confirmed w/pt

## 2022-01-18 ENCOUNTER — TELEPHONE (OUTPATIENT)
Dept: OBGYN UNIT | Facility: HOSPITAL | Age: 32
End: 2022-01-18

## 2022-01-19 ENCOUNTER — TELEPHONE (OUTPATIENT)
Dept: OBGYN UNIT | Facility: HOSPITAL | Age: 32
End: 2022-01-19

## 2022-01-21 ENCOUNTER — TELEPHONE (OUTPATIENT)
Dept: OBGYN CLINIC | Facility: CLINIC | Age: 32
End: 2022-01-21

## 2022-01-21 NOTE — TELEPHONE ENCOUNTER
Pt advised of MM recommendation: Ok sounds like she is doing ok with regards to bleeding. That BP is a little elevated.  Please ask her to check it three times per day & call if she is getting 140/90 or over (either number) as we will then want to put h

## 2022-01-21 NOTE — TELEPHONE ENCOUNTER
No fever, pelvic pain or epigastic pain or visual spots. She c/o HA, blurry vision. -took Tylenol and HA resolved. Denies dizziness. Swelling on her legs have decreased, I asked pt to take her BP, it was 136/83.  She got 3-4 hours of sleep last night, S

## 2022-01-21 NOTE — TELEPHONE ENCOUNTER
S: c/o passing lime size clot last noc 2100 followed by quarter size clots    B: vd 1/13 2nd degree laceration,     A: Pt has bright red bleeding, it has been same for last 4 days. Soaks half a pad q2-3hrs.  Denies fever, c/o sharp pain when urinatin

## 2022-01-22 ENCOUNTER — NURSE ONLY (OUTPATIENT)
Dept: OBGYN CLINIC | Facility: CLINIC | Age: 32
End: 2022-01-22
Payer: COMMERCIAL

## 2022-01-22 VITALS
SYSTOLIC BLOOD PRESSURE: 124 MMHG | WEIGHT: 222 LBS | HEART RATE: 85 BPM | DIASTOLIC BLOOD PRESSURE: 80 MMHG | HEIGHT: 66 IN | BODY MASS INDEX: 35.68 KG/M2

## 2022-01-22 DIAGNOSIS — R30.9 PAIN WITH URINATION: Primary | ICD-10-CM

## 2022-01-22 LAB
BILIRUBIN: NEGATIVE
GLUCOSE (URINE DIPSTICK): NEGATIVE MG/DL
KETONES (URINE DIPSTICK): NEGATIVE MG/DL
MULTISTIX LOT#: ABNORMAL NUMERIC
NITRITE, URINE: NEGATIVE
PH, URINE: 7 (ref 4.5–8)
PROTEIN (URINE DIPSTICK): NEGATIVE MG/DL
SPECIFIC GRAVITY: 1.02 (ref 1–1.03)
UROBILINOGEN,SEMI-QN: 0.2 MG/DL (ref 0–1.9)

## 2022-01-22 PROCEDURE — 3079F DIAST BP 80-89 MM HG: CPT | Performed by: OBSTETRICS & GYNECOLOGY

## 2022-01-22 PROCEDURE — 81002 URINALYSIS NONAUTO W/O SCOPE: CPT | Performed by: OBSTETRICS & GYNECOLOGY

## 2022-01-22 PROCEDURE — 3074F SYST BP LT 130 MM HG: CPT | Performed by: OBSTETRICS & GYNECOLOGY

## 2022-01-22 PROCEDURE — 87086 URINE CULTURE/COLONY COUNT: CPT | Performed by: OBSTETRICS & GYNECOLOGY

## 2022-01-22 PROCEDURE — 3008F BODY MASS INDEX DOCD: CPT | Performed by: OBSTETRICS & GYNECOLOGY

## 2022-01-22 RX ORDER — ACETAMINOPHEN 500 MG
500 TABLET ORAL EVERY 6 HOURS PRN
COMMUNITY

## 2022-01-23 RX ORDER — NITROFURANTOIN 25; 75 MG/1; MG/1
100 CAPSULE ORAL 2 TIMES DAILY
Qty: 14 CAPSULE | Refills: 0 | Status: SHIPPED | OUTPATIENT
Start: 2022-01-23 | End: 2022-01-30

## 2022-02-16 ENCOUNTER — TELEPHONE (OUTPATIENT)
Dept: OBGYN CLINIC | Facility: CLINIC | Age: 32
End: 2022-02-16

## 2022-02-16 ENCOUNTER — OFFICE VISIT (OUTPATIENT)
Dept: OBGYN CLINIC | Facility: CLINIC | Age: 32
End: 2022-02-16
Payer: COMMERCIAL

## 2022-02-16 VITALS
WEIGHT: 224.63 LBS | DIASTOLIC BLOOD PRESSURE: 76 MMHG | BODY MASS INDEX: 36.1 KG/M2 | TEMPERATURE: 98 F | HEART RATE: 80 BPM | HEIGHT: 66 IN | SYSTOLIC BLOOD PRESSURE: 116 MMHG

## 2022-02-16 DIAGNOSIS — R30.0 DYSURIA: ICD-10-CM

## 2022-02-16 DIAGNOSIS — R39.9 UTI SYMPTOMS: Primary | ICD-10-CM

## 2022-02-16 LAB
BILIRUB UR QL STRIP.AUTO: NEGATIVE
BILIRUBIN: NEGATIVE
COLOR UR AUTO: YELLOW
GLUCOSE (URINE DIPSTICK): NEGATIVE MG/DL
GLUCOSE UR STRIP.AUTO-MCNC: NEGATIVE MG/DL
KETONES (URINE DIPSTICK): NEGATIVE MG/DL
KETONES UR STRIP.AUTO-MCNC: NEGATIVE MG/DL
MULTISTIX LOT#: ABNORMAL NUMERIC
NITRITE UR QL STRIP.AUTO: NEGATIVE
NITRITE, URINE: NEGATIVE
PH UR STRIP.AUTO: 6 [PH] (ref 5–8)
PH, URINE: 6 (ref 4.5–8)
PROT UR STRIP.AUTO-MCNC: NEGATIVE MG/DL
PROTEIN (URINE DIPSTICK): NEGATIVE MG/DL
SP GR UR STRIP.AUTO: 1.01 (ref 1–1.03)
SPECIFIC GRAVITY: 1.02 (ref 1–1.03)
URINE-COLOR: YELLOW
UROBILINOGEN UR STRIP.AUTO-MCNC: <2 MG/DL
UROBILINOGEN,SEMI-QN: 0.2 MG/DL (ref 0–1.9)

## 2022-02-16 PROCEDURE — 87086 URINE CULTURE/COLONY COUNT: CPT | Performed by: STUDENT IN AN ORGANIZED HEALTH CARE EDUCATION/TRAINING PROGRAM

## 2022-02-16 PROCEDURE — 3078F DIAST BP <80 MM HG: CPT | Performed by: STUDENT IN AN ORGANIZED HEALTH CARE EDUCATION/TRAINING PROGRAM

## 2022-02-16 PROCEDURE — 3074F SYST BP LT 130 MM HG: CPT | Performed by: STUDENT IN AN ORGANIZED HEALTH CARE EDUCATION/TRAINING PROGRAM

## 2022-02-16 PROCEDURE — 3008F BODY MASS INDEX DOCD: CPT | Performed by: STUDENT IN AN ORGANIZED HEALTH CARE EDUCATION/TRAINING PROGRAM

## 2022-02-16 PROCEDURE — 81002 URINALYSIS NONAUTO W/O SCOPE: CPT | Performed by: STUDENT IN AN ORGANIZED HEALTH CARE EDUCATION/TRAINING PROGRAM

## 2022-02-16 PROCEDURE — 81001 URINALYSIS AUTO W/SCOPE: CPT | Performed by: STUDENT IN AN ORGANIZED HEALTH CARE EDUCATION/TRAINING PROGRAM

## 2022-02-16 PROCEDURE — 99214 OFFICE O/P EST MOD 30 MIN: CPT | Performed by: STUDENT IN AN ORGANIZED HEALTH CARE EDUCATION/TRAINING PROGRAM

## 2022-02-16 NOTE — TELEPHONE ENCOUNTER
C/o dysuria and back pain for 2d. Pain on left breast, with red streak, warm to touch, gets hard toward her axilla, denies fever. She spoke with LC, advised to use warm packs, pump frequently, was told it could be yeast and f/u with OB. Advised pt to be seen in office for evaluation. Per AM pt can be seen within the hour. Pt on her way. Verb. Understanding.

## 2022-02-21 NOTE — PROGRESS NOTES
Pt aware of normal results and recommendation to continue abx given for mastitis. Understanding verbalized. Pt would like to know about recommendation re: pain when she passes urine. Note routed to AM for advisement.

## 2022-02-22 NOTE — PROGRESS NOTES
Pt advised per AM \"We don't have a clear explanation why she was having pain with voiding - sometimes is from small kidney stone, or urine could be very concentrated   Would make sure she is well hydrated   Can take ibuprofen and tylenol     At our visit it did not seem like it was a vaginal infection but if she is having any new abnormal discharge we can test for this - looks like has an appt coming up soon\" Understanding verbalized.

## 2022-02-24 ENCOUNTER — NURSE ONLY (OUTPATIENT)
Dept: LACTATION | Facility: HOSPITAL | Age: 32
End: 2022-02-24
Attending: STUDENT IN AN ORGANIZED HEALTH CARE EDUCATION/TRAINING PROGRAM
Payer: COMMERCIAL

## 2022-02-24 PROCEDURE — 99213 OFFICE O/P EST LOW 20 MIN: CPT

## 2022-02-24 NOTE — PROGRESS NOTES
LACTATION NOTE - MOTHER      Evaluation Type: Outpatient Initial    Problems identified  Problems identified: Knowledge deficit;Milk supply WNL; Nipple pain; Nipple trauma; Recent antibiotic use  Problems Identified Other:  continues to have latch issues. Appear to be related to maternal positioning and poor attachment    Maternal history  Maternal history: Polycystic ovarian syndrome (PCOS); Hypothyroid  Other/comment: Hx chronic low back pain, abnormal glucose tolerance test, & COVID-19 infection 2020 with mild s/s. Breastfeeding goal  Breastfeeding goal: To maintain breast milk feeding per patient goal    Maternal Assessment  Bilateral Breasts: Dense;Symmetrical;Other (comment) (Firm area on outer aspect of left breast, some redness)  Bilateral Nipples: Slightly everted/short;Erythema; Sore  Prior breastfeeding experience (comment below): Primip  Breastfeeding Assistance: Breastfeeding assistance provided with permission    Pain assessment  Pain, additional: Pain w/initial sucks only;Pain w/pumping;Pain location  Pain Location: Nipples  Location/Comment: Nipple pain appears to be related to pump shield size, increased sizing with increased comfort and milk output  Pain scale comment: Some pain with pumping d/t sore nipples  Treatment of Sore Nipples: Deeper latch techniques; Expressed breast milk; Lanolin    Guidelines for use of:  Equipment: Lanolin  Breast pump type: Medela Pump In Style MaxFlow  Current use of pump[de-identified] States about every three hours  Suggested use of pump: For comfort as needed;Pump each time a supplement is offered;Pump if infant is not latching to breast  Reported pumping volumes (ml): 2-3 oz  Other (comment): Assisted with feed at breast. With some adjustments, baby was able to achieve a deeper latch. Discussion on treatment for mastitis/plugged ducts as patient is on antibiotics prescribed by her OB for a suspected mastitis.  Further discussion on tapering down milk production per request of patient. States she is overwhelmed and tired with new baby and may decide to wean from the breast. Discussed feelings she is experiencing and LC offered information on Nurturing Moms group and other mental health support.

## 2022-03-01 ENCOUNTER — POSTPARTUM (OUTPATIENT)
Dept: OBGYN CLINIC | Facility: CLINIC | Age: 32
End: 2022-03-01
Payer: COMMERCIAL

## 2022-03-01 VITALS
BODY MASS INDEX: 36.06 KG/M2 | HEIGHT: 66 IN | SYSTOLIC BLOOD PRESSURE: 114 MMHG | WEIGHT: 224.38 LBS | HEART RATE: 69 BPM | DIASTOLIC BLOOD PRESSURE: 76 MMHG

## 2022-03-01 DIAGNOSIS — N76.0 VAGINITIS AND VULVOVAGINITIS: ICD-10-CM

## 2022-03-01 PROCEDURE — 87480 CANDIDA DNA DIR PROBE: CPT | Performed by: STUDENT IN AN ORGANIZED HEALTH CARE EDUCATION/TRAINING PROGRAM

## 2022-03-01 PROCEDURE — 3074F SYST BP LT 130 MM HG: CPT | Performed by: STUDENT IN AN ORGANIZED HEALTH CARE EDUCATION/TRAINING PROGRAM

## 2022-03-01 PROCEDURE — 87510 GARDNER VAG DNA DIR PROBE: CPT | Performed by: STUDENT IN AN ORGANIZED HEALTH CARE EDUCATION/TRAINING PROGRAM

## 2022-03-01 PROCEDURE — 87660 TRICHOMONAS VAGIN DIR PROBE: CPT | Performed by: STUDENT IN AN ORGANIZED HEALTH CARE EDUCATION/TRAINING PROGRAM

## 2022-03-01 PROCEDURE — 3078F DIAST BP <80 MM HG: CPT | Performed by: STUDENT IN AN ORGANIZED HEALTH CARE EDUCATION/TRAINING PROGRAM

## 2022-03-01 PROCEDURE — 3008F BODY MASS INDEX DOCD: CPT | Performed by: STUDENT IN AN ORGANIZED HEALTH CARE EDUCATION/TRAINING PROGRAM

## 2022-03-01 RX ORDER — NYSTATIN 100000 U/G
1 OINTMENT TOPICAL 2 TIMES DAILY
Qty: 15 G | Refills: 1 | Status: SHIPPED | OUTPATIENT
Start: 2022-03-01 | End: 2022-03-15

## 2022-03-02 ENCOUNTER — TELEPHONE (OUTPATIENT)
Dept: OBGYN CLINIC | Facility: CLINIC | Age: 32
End: 2022-03-02

## 2022-03-02 RX ORDER — METRONIDAZOLE 500 MG/1
500 TABLET ORAL 2 TIMES DAILY
Qty: 14 TABLET | Refills: 0 | Status: SHIPPED | OUTPATIENT
Start: 2022-03-02 | End: 2022-03-09

## 2022-03-02 NOTE — TELEPHONE ENCOUNTER
Spoke with the patient about the change to the pharmacy for her nipple cream. She is aware that it will cost but the amount is unsure. Prescription for nipple cream sent to [facility] at [address], [place] ph:[phone]. In conversation we discussed her Vaginosis results. She is aware Dr Olivia Rudd has not yet reviewed and given her results. After speaking with Dr Olivia Rudd she recommends Flagyl and does not have to use the nystatin if she does not want to. Dr Olivia Rudd routed order. Understanding verbalized.

## 2022-03-02 NOTE — TELEPHONE ENCOUNTER
----- Message from Carlis Seip, MD sent at 3/1/2022  3:39 PM CST -----  Regarding: all purpose nipple ointment  I tried to eprescribe all purpose nipple ointment for the patient but she left before I could print her script, did not realize it could not be eprescribed  Lizeth Bagley told me to message you so you could direct pt to whichever pharmacies have it (she said something like [facility] )

## 2022-03-02 NOTE — PROGRESS NOTES
Patient aware of Vaginosis/Vaginitis results and recommendations for flagyl. OK to dc nystatin ointment, can always restart if sx not improved 1 wk after flagyl  Patient verbalized understanding.

## 2022-03-02 NOTE — TELEPHONE ENCOUNTER
Spoke with patient. She is aware the nipple cream comes from [facility]. Contact information given. Order faxed. She would like to start with the 20 g tube. Understanding verbalized.

## 2022-03-17 NOTE — TELEPHONE ENCOUNTER
Spoke with Evie Marr from [address] [place] [address]. Explained to her that the patient has not received her medication but states she was charged $300. All purpose nipple screen was prescribed and costs $91. Evie Marr will call the patient and review details to resolve issue. Will also call patient.

## 2022-03-17 NOTE — TELEPHONE ENCOUNTER
Pt can not get ib touch w/ [address] to  her meds. per pt she has pd $300.00 for her meds. can we resend to another pharmacy?

## 2022-03-17 NOTE — TELEPHONE ENCOUNTER
Spoke with patient. She is aware [facility] pharmacy will be calling her to discuss situation. With conversation she was charged the correct amount of $91 not $300. She would still like the prescription. Understanding verbalized.

## 2022-04-01 ENCOUNTER — OFFICE VISIT (OUTPATIENT)
Dept: OBGYN CLINIC | Facility: CLINIC | Age: 32
End: 2022-04-01
Payer: COMMERCIAL

## 2022-04-01 ENCOUNTER — TELEPHONE (OUTPATIENT)
Dept: OBGYN CLINIC | Facility: CLINIC | Age: 32
End: 2022-04-01

## 2022-04-01 VITALS
SYSTOLIC BLOOD PRESSURE: 118 MMHG | BODY MASS INDEX: 36.13 KG/M2 | HEART RATE: 66 BPM | DIASTOLIC BLOOD PRESSURE: 72 MMHG | WEIGHT: 224.81 LBS | HEIGHT: 66 IN

## 2022-04-01 DIAGNOSIS — N76.0 VAGINITIS AND VULVOVAGINITIS: Primary | ICD-10-CM

## 2022-04-01 LAB
APPEARANCE: CLEAR
BILIRUBIN: NEGATIVE
GLUCOSE (URINE DIPSTICK): NEGATIVE MG/DL
KETONES (URINE DIPSTICK): NEGATIVE MG/DL
MULTISTIX LOT#: ABNORMAL NUMERIC
NITRITE, URINE: NEGATIVE
OCCULT BLOOD: NEGATIVE
PH, URINE: 7 (ref 4.5–8)
PROTEIN (URINE DIPSTICK): NEGATIVE MG/DL
SPECIFIC GRAVITY: 1.02 (ref 1–1.03)
URINE-COLOR: YELLOW
UROBILINOGEN,SEMI-QN: 0.2 MG/DL (ref 0–1.9)

## 2022-04-01 PROCEDURE — 99213 OFFICE O/P EST LOW 20 MIN: CPT

## 2022-04-01 PROCEDURE — 3078F DIAST BP <80 MM HG: CPT

## 2022-04-01 PROCEDURE — 87660 TRICHOMONAS VAGIN DIR PROBE: CPT

## 2022-04-01 PROCEDURE — 3008F BODY MASS INDEX DOCD: CPT

## 2022-04-01 PROCEDURE — 87480 CANDIDA DNA DIR PROBE: CPT

## 2022-04-01 PROCEDURE — 87086 URINE CULTURE/COLONY COUNT: CPT

## 2022-04-01 PROCEDURE — 3074F SYST BP LT 130 MM HG: CPT

## 2022-04-01 PROCEDURE — 81002 URINALYSIS NONAUTO W/O SCOPE: CPT

## 2022-04-01 PROCEDURE — 87510 GARDNER VAG DNA DIR PROBE: CPT

## 2022-04-01 NOTE — TELEPHONE ENCOUNTER
Patient complains of vaginal itching and breast pain. Appointment schedule to see provider for evaluation.

## 2022-04-01 NOTE — TELEPHONE ENCOUNTER
Pt called to speak to a nurse about breast pain she is having. Pt states she had mastitis before and is experiencing similar symptoms again. Please advise.

## 2022-04-05 LAB
ABSOLUTE BASOPHILS: 46 CELLS/UL (ref 0–200)
ABSOLUTE EOSINOPHILS: 193 CELLS/UL (ref 15–500)
ABSOLUTE LYMPHOCYTES: 3045 CELLS/UL (ref 850–3900)
ABSOLUTE MONOCYTES: 708 CELLS/UL (ref 200–950)
ABSOLUTE NEUTROPHILS: 5207 CELLS/UL (ref 1500–7800)
BASOPHILS: 0.5 %
EOSINOPHILS: 2.1 %
FERRITIN: 33 NG/ML (ref 16–154)
HEMATOCRIT: 38.3 % (ref 35–45)
HEMOGLOBIN: 12.8 G/DL (ref 11.7–15.5)
LYMPHOCYTES: 33.1 %
MCH: 28.1 PG (ref 27–33)
MCHC: 33.4 G/DL (ref 32–36)
MCV: 84 FL (ref 80–100)
MONOCYTES: 7.7 %
MPV: 11.7 FL (ref 7.5–12.5)
NEUTROPHILS: 56.6 %
PLATELET COUNT: 286 THOUSAND/UL (ref 140–400)
RDW: 12.3 % (ref 11–15)
RED BLOOD CELL COUNT: 4.56 MILLION/UL (ref 3.8–5.1)
WHITE BLOOD CELL COUNT: 9.2 THOUSAND/UL (ref 3.8–10.8)

## 2022-04-06 ENCOUNTER — TELEPHONE (OUTPATIENT)
Dept: OBGYN CLINIC | Facility: CLINIC | Age: 32
End: 2022-04-06

## 2022-04-06 NOTE — TELEPHONE ENCOUNTER
Reviewed recent test results with patient. Aware urine culture neg, but positive for BV. Per pt, oral or vaginal treatment okay. Would like rx to go to [facility]. Pt also states she has a pimple near vaginal and had some red discharge x1. Pt advised to monitor, should call back with recurrent or worsening symptoms. Understanding verbalized.

## 2022-04-07 ENCOUNTER — TELEPHONE (OUTPATIENT)
Dept: OBGYN CLINIC | Facility: CLINIC | Age: 32
End: 2022-04-07

## 2022-04-07 RX ORDER — METRONIDAZOLE 7.5 MG/G
1 GEL VAGINAL NIGHTLY
Qty: 70 G | Refills: 0 | Status: SHIPPED | OUTPATIENT
Start: 2022-04-07 | End: 2022-04-12

## 2022-04-07 NOTE — TELEPHONE ENCOUNTER
Message left per HIPAA form that [facility] sent her prescription the her pharmacy. To call with any questions.

## 2022-04-21 ENCOUNTER — TELEPHONE (OUTPATIENT)
Dept: OBGYN CLINIC | Facility: CLINIC | Age: 32
End: 2022-04-21

## 2022-04-25 ENCOUNTER — TELEPHONE (OUTPATIENT)
Dept: FAMILY MEDICINE CLINIC | Facility: CLINIC | Age: 32
End: 2022-04-25

## 2022-04-25 ENCOUNTER — OFFICE VISIT (OUTPATIENT)
Dept: FAMILY MEDICINE CLINIC | Facility: CLINIC | Age: 32
End: 2022-04-25
Payer: COMMERCIAL

## 2022-04-25 VITALS
SYSTOLIC BLOOD PRESSURE: 116 MMHG | RESPIRATION RATE: 18 BRPM | HEART RATE: 67 BPM | HEIGHT: 66 IN | TEMPERATURE: 98 F | DIASTOLIC BLOOD PRESSURE: 74 MMHG | WEIGHT: 225.19 LBS | BODY MASS INDEX: 36.19 KG/M2

## 2022-04-25 DIAGNOSIS — N94.9 VAGINAL BURNING: ICD-10-CM

## 2022-04-25 DIAGNOSIS — Z91.89 BREASTFEEDING PROBLEM: ICD-10-CM

## 2022-04-25 DIAGNOSIS — M54.50 BILATERAL LOW BACK PAIN WITHOUT SCIATICA, UNSPECIFIED CHRONICITY: ICD-10-CM

## 2022-04-25 DIAGNOSIS — N89.8 VAGINAL DISCHARGE: Primary | ICD-10-CM

## 2022-04-25 PROCEDURE — 87510 GARDNER VAG DNA DIR PROBE: CPT | Performed by: FAMILY MEDICINE

## 2022-04-25 PROCEDURE — 87660 TRICHOMONAS VAGIN DIR PROBE: CPT | Performed by: FAMILY MEDICINE

## 2022-04-25 PROCEDURE — 3008F BODY MASS INDEX DOCD: CPT | Performed by: FAMILY MEDICINE

## 2022-04-25 PROCEDURE — 87480 CANDIDA DNA DIR PROBE: CPT | Performed by: FAMILY MEDICINE

## 2022-04-25 PROCEDURE — 99214 OFFICE O/P EST MOD 30 MIN: CPT | Performed by: FAMILY MEDICINE

## 2022-04-25 PROCEDURE — 3078F DIAST BP <80 MM HG: CPT | Performed by: FAMILY MEDICINE

## 2022-04-25 PROCEDURE — 3074F SYST BP LT 130 MM HG: CPT | Performed by: FAMILY MEDICINE

## 2022-04-25 NOTE — TELEPHONE ENCOUNTER
S/w Rosita. She is agreeable to come in today for an appt before 6 PM.   Patient sts she will arrive today around 5:50 PM.    Given to front ofc to schedule.

## 2022-04-25 NOTE — TELEPHONE ENCOUNTER
C/o dysuria and vaginal itching. Earliest appt available is 4/30, advised to be seen by PCP or urgent care. Pt verb understanding.

## 2022-04-25 NOTE — TELEPHONE ENCOUNTER
Pt having vaginal itching with slight discharge. Pt states she has spoken with her OBGYN office and she was unable to be seen in a timely manner and was advised to call PCP.     Pt requesting appt soon as possible     Please advise

## 2022-04-26 RX ORDER — METRONIDAZOLE 7.5 MG/G
1 GEL VAGINAL NIGHTLY
Qty: 70 G | Refills: 0 | Status: SHIPPED | OUTPATIENT
Start: 2022-04-26 | End: 2022-05-01

## 2022-05-02 ENCOUNTER — TELEPHONE (OUTPATIENT)
Dept: FAMILY MEDICINE CLINIC | Facility: CLINIC | Age: 32
End: 2022-05-02

## 2022-05-02 RX ORDER — METRONIDAZOLE 7.5 MG/G
GEL VAGINAL
Qty: 70 G | Refills: 0 | OUTPATIENT
Start: 2022-05-02

## 2022-05-02 NOTE — TELEPHONE ENCOUNTER
Confirmed w dr Pia Rodriguez days of treatment should be 7 days, so place metrogel refill for same med, same dose and sig should state \"nightly for 2 additional days\". Call to eduardo/pharmacist/tg-provided verbal order for metronidazole 0.75% vaginal gel, One applicatorful vaginally nightly for 2 additional days. eduardo sts med only comes in 70 g tube. Requested they reinforce only 2 more days of nightly dosing w pt.   eduardo voices understanding, repeats correctly, no questions. Call to pt-advised of dr chow's recommendations and med order sent to pharmacy. Reinforced this medication only comes in same size 70 g tube, but reinforced dr's instructions to use for a total of only 7 nights. Patient voices understanding/agrees with plan/no further questions.

## 2022-05-02 NOTE — TELEPHONE ENCOUNTER
Yes it should be 7 days. Okay to call new tube of the medication for the patient as needed.   Thank you

## 2022-05-02 NOTE — TELEPHONE ENCOUNTER
Pt has question regarding prescription from appointment on 4/25 with Dr Kar Mixon. Pt was prescribed metroNIDAZOLE 0.75 % Vaginal Gel. Pt states she thought the prescription would be for 1 week (7 days), however she picked up from the pharmacy and only received 5 days. Pt wanting to clarify with Dr Kar Mixon if she should be using for 5 days or 7 days? Office notes from appointment states 7 days, however message to pharmacy states 5 days.     Please advise

## 2022-12-19 NOTE — TELEPHONE ENCOUNTER
Patient needs to present to the hospital and be admitted to the psych wards. She is very depressed and anxious. She needs to call 911 and talk to her psychiatrist. This is not a VNS issue.    Pt calling to speak with a nurse about her headaches not getting better. Please advise.

## 2023-05-30 ENCOUNTER — OFFICE VISIT (OUTPATIENT)
Dept: FAMILY MEDICINE CLINIC | Facility: CLINIC | Age: 33
End: 2023-05-30
Payer: COMMERCIAL

## 2023-05-30 VITALS
HEIGHT: 66 IN | RESPIRATION RATE: 16 BRPM | HEART RATE: 64 BPM | TEMPERATURE: 97 F | SYSTOLIC BLOOD PRESSURE: 118 MMHG | WEIGHT: 213 LBS | DIASTOLIC BLOOD PRESSURE: 68 MMHG | BODY MASS INDEX: 34.23 KG/M2

## 2023-05-30 DIAGNOSIS — L85.3 DRY SKIN: ICD-10-CM

## 2023-05-30 DIAGNOSIS — N64.4 PAINFUL LUMPY LEFT BREAST: ICD-10-CM

## 2023-05-30 DIAGNOSIS — N64.89 BREAST ASYMMETRY: ICD-10-CM

## 2023-05-30 DIAGNOSIS — Z00.00 PHYSICAL EXAM, ANNUAL: Primary | ICD-10-CM

## 2023-05-30 DIAGNOSIS — N63.20 PAINFUL LUMPY LEFT BREAST: ICD-10-CM

## 2023-05-30 PROCEDURE — 99213 OFFICE O/P EST LOW 20 MIN: CPT | Performed by: FAMILY MEDICINE

## 2023-05-30 PROCEDURE — 3074F SYST BP LT 130 MM HG: CPT | Performed by: FAMILY MEDICINE

## 2023-05-30 PROCEDURE — 3078F DIAST BP <80 MM HG: CPT | Performed by: FAMILY MEDICINE

## 2023-05-30 PROCEDURE — 99395 PREV VISIT EST AGE 18-39: CPT | Performed by: FAMILY MEDICINE

## 2023-05-30 PROCEDURE — 3008F BODY MASS INDEX DOCD: CPT | Performed by: FAMILY MEDICINE

## 2023-05-30 NOTE — PATIENT INSTRUCTIONS
Call [phone] to schedule Mammogram.   Hydrocortisone 1% over-the-counter use topically to hands at nighttime  Try Aquaphor at nighttime to your hands. Sleep with cotton gloves to keep the moisture. Keep good hydration. Healthy diet. Set up an appointment with GYN.

## 2023-05-31 ENCOUNTER — TELEPHONE (OUTPATIENT)
Dept: FAMILY MEDICINE CLINIC | Facility: CLINIC | Age: 33
End: 2023-05-31

## 2023-05-31 NOTE — TELEPHONE ENCOUNTER
Take Advil 200 mg 2-3 tablets every 6-8 hours for pain. Call with update on Friday am, if worse- go to ER.

## 2023-05-31 NOTE — TELEPHONE ENCOUNTER
Received live call from patient regarding left breast pain   Was seen yesterday   States she already had mild pain in left breast prior to exam.  Reports pain was exacerbated following the breast exam  Rates pain 8/10  Denies any shortness of breath, chest pain, palpitations, difficulty breathing     Management: Tylenol (last took yesterday), reports this did help with her symptoms     First available mammogram was 6/9/23    D/t pts intensity of pain, discussed ER eval today.    Rosita then states her pain is mild and tolerable, and not willing to go to ER    She is asking if mammogram order can be changed to stat

## 2023-05-31 NOTE — TELEPHONE ENCOUNTER
ask the following questions:  1. What are your symptoms? PAIN ON LEFT BREAST  2. Ask the pt to describe severity of symptoms: 8/10, THROBBING PAIN AFTER BREAT EXAM YESTERDAY   Example: headache (pain level from 0-10, worst headache of your life?)  3. How long have you been having these symptoms? SINCE 5/30/2023  4. Have you done anything already to treat your symptoms? TAKEN TYLENOL TO HELP WITH PAIN                       *Then route LIVE CALL to triage.

## 2023-06-01 NOTE — TELEPHONE ENCOUNTER
QUEENIEI: Pt was transferred as a live call to me in triage. She called to notify Dr. Barry Navarro her breast is red today and feels warm to the touch. I read Dr. Meme Yoder recommendation to her from yesterday advising her to go to the ER with any worsening sx. Pt stated \" I have been treated for Mastitis before by my OB/GYN. I am going to call there and see what they say first.\" I instructed pt that if she does not  get a response from them today she needs to proceed to the ER as recommended by Dr. Barry Navarro.  Pt. Agreed to plan and verbalized understanding

## 2023-06-01 NOTE — TELEPHONE ENCOUNTER
Yes I agree with evaluation today. Since symptoms are worsening-now she has a redness-agree with ER evaluation.   Thank you

## 2023-06-02 ENCOUNTER — TELEPHONE (OUTPATIENT)
Dept: FAMILY MEDICINE CLINIC | Facility: CLINIC | Age: 33
End: 2023-06-02

## 2023-06-02 NOTE — TELEPHONE ENCOUNTER
PT calling to give update. .  Pt stated she have Mastitis. She still have pain and was seen in urgent care yesterday and she is now on antibiotics . Currently she is experiencing a little pain not to much. She's been experiencing some burning sensation and redness but is a little better, since taking antibiotics.    She will like to know what the next steps are

## 2023-06-02 NOTE — TELEPHONE ENCOUNTER
Received call back from Moris. She went to a [facility] . Was prescribed dicloxacillin 500mg Q6hrs, #40, ref 0    Moris will have  fax us the records. Provided fax number. Advised to finish antibiotic as prescribed and complete mammogram.   Requested she update us with any changes in status or questions in the meantime. Pt verbalized understanding and is agreeable to plan. No further questions at this time.

## 2023-06-02 NOTE — TELEPHONE ENCOUNTER
Please give patient a call and ask what antibiotic she is taking? Please have her to forward records from the urgent care to our office I do not see anything in epic system. She would have to finish antibiotic and get her mammogram done for evaluation.   Thank you

## 2023-06-06 ENCOUNTER — MED REC SCAN ONLY (OUTPATIENT)
Dept: FAMILY MEDICINE CLINIC | Facility: CLINIC | Age: 33
End: 2023-06-06

## 2023-06-09 ENCOUNTER — HOSPITAL ENCOUNTER (OUTPATIENT)
Dept: MAMMOGRAPHY | Facility: HOSPITAL | Age: 33
Discharge: HOME | End: 2023-06-09
Attending: FAMILY MEDICINE
Payer: COMMERCIAL

## 2023-06-09 DIAGNOSIS — N63.20 PAINFUL LUMPY LEFT BREAST: ICD-10-CM

## 2023-06-09 DIAGNOSIS — N64.4 PAINFUL LUMPY LEFT BREAST: ICD-10-CM

## 2023-06-09 DIAGNOSIS — N64.89 BREAST ASYMMETRY: ICD-10-CM

## 2023-06-09 PROCEDURE — 76642 ULTRASOUND BREAST LIMITED: CPT | Performed by: FAMILY MEDICINE

## 2023-06-09 PROCEDURE — 77066 DX MAMMO INCL CAD BI: CPT | Performed by: FAMILY MEDICINE

## 2023-06-09 PROCEDURE — 77062 BREAST TOMOSYNTHESIS BI: CPT | Performed by: FAMILY MEDICINE

## 2023-06-12 DIAGNOSIS — N63.20 PAINFUL LUMPY LEFT BREAST: Primary | ICD-10-CM

## 2023-06-12 DIAGNOSIS — N64.89 BREAST ASYMMETRY: ICD-10-CM

## 2023-06-12 DIAGNOSIS — N64.4 PAINFUL LUMPY LEFT BREAST: Primary | ICD-10-CM

## 2023-06-19 ENCOUNTER — OFFICE VISIT (OUTPATIENT)
Dept: SURGERY | Facility: CLINIC | Age: 33
End: 2023-06-19
Payer: COMMERCIAL

## 2023-06-19 VITALS
RESPIRATION RATE: 14 BRPM | HEIGHT: 66 IN | WEIGHT: 208.38 LBS | OXYGEN SATURATION: 100 % | HEART RATE: 67 BPM | SYSTOLIC BLOOD PRESSURE: 115 MMHG | DIASTOLIC BLOOD PRESSURE: 78 MMHG | BODY MASS INDEX: 33.49 KG/M2

## 2023-06-19 DIAGNOSIS — N64.4 BREAST PAIN, LEFT: Primary | ICD-10-CM

## 2023-06-19 PROCEDURE — 99204 OFFICE O/P NEW MOD 45 MIN: CPT | Performed by: SURGERY

## 2023-06-19 PROCEDURE — 3078F DIAST BP <80 MM HG: CPT | Performed by: SURGERY

## 2023-06-19 PROCEDURE — 3008F BODY MASS INDEX DOCD: CPT | Performed by: SURGERY

## 2023-06-19 PROCEDURE — 3074F SYST BP LT 130 MM HG: CPT | Performed by: SURGERY

## 2023-06-21 PROBLEM — N64.4 BREAST PAIN, LEFT: Status: ACTIVE | Noted: 2023-06-21

## 2023-07-13 ENCOUNTER — TELEPHONE (OUTPATIENT)
Dept: FAMILY MEDICINE CLINIC | Facility: CLINIC | Age: 33
End: 2023-07-13

## 2023-07-13 NOTE — TELEPHONE ENCOUNTER
Pt saw Erendira Navarrete PA-C at [facility] for knee pain and was diagnosed w/ arthritis. Pt states was advised to request lab orders from PCP for further testing. Advised pt to contact Erendira Navarrete PA-C office to request OV notes and request for lab orders to be faxed to our office for review by on call provider as cannot place orders w/o documentation / request.    Ashley Reynolds pt visit may be necessary prior to placing orders. Pt to contact [facility] to request information be faxed to our office.

## 2023-07-14 ENCOUNTER — TELEPHONE (OUTPATIENT)
Dept: ORTHOPEDICS CLINIC | Facility: CLINIC | Age: 33
End: 2023-07-14

## 2023-07-14 DIAGNOSIS — M25.572 LEFT ANKLE PAIN, UNSPECIFIED CHRONICITY: ICD-10-CM

## 2023-07-14 DIAGNOSIS — Z01.89 ENCOUNTER FOR LOWER EXTREMITY COMPARISON IMAGING STUDY: Primary | ICD-10-CM

## 2023-07-14 DIAGNOSIS — M25.562 LEFT KNEE PAIN, UNSPECIFIED CHRONICITY: ICD-10-CM

## 2023-07-14 NOTE — TELEPHONE ENCOUNTER
Reviewed patients chart, xray orders are required. Order placed for leftknee and ankle xrays.    Please contact patient advise to arrive 15 mins prior to patients appt to complete x-ray order and schedule patients xray appt-Thank you

## 2023-07-14 NOTE — TELEPHONE ENCOUNTER
Patient scheduled with Dr. Moody Cary for left knee pain radiating to ankle. No recent imaging in epic, patient will try to obtain disc for appt.      Future Appointments   Date Time Provider Carlos Posey   7/26/2023  1:20 PM Jeanette Kee MD [facility] [place] [id number]   8/14/2023 11:30 AM Aga Desai MD [facility]

## 2023-07-18 ENCOUNTER — OFFICE VISIT (OUTPATIENT)
Facility: CLINIC | Age: 33
End: 2023-07-18
Payer: COMMERCIAL

## 2023-07-18 VITALS
SYSTOLIC BLOOD PRESSURE: 110 MMHG | HEIGHT: 66 IN | DIASTOLIC BLOOD PRESSURE: 60 MMHG | BODY MASS INDEX: 33.59 KG/M2 | WEIGHT: 209 LBS | HEART RATE: 61 BPM

## 2023-07-18 DIAGNOSIS — Z12.4 CERVICAL CANCER SCREENING: ICD-10-CM

## 2023-07-18 DIAGNOSIS — Z01.419 ENCOUNTER FOR WELL WOMAN EXAM WITH ROUTINE GYNECOLOGICAL EXAM: Primary | ICD-10-CM

## 2023-07-18 PROCEDURE — 3008F BODY MASS INDEX DOCD: CPT | Performed by: OBSTETRICS & GYNECOLOGY

## 2023-07-18 PROCEDURE — 3078F DIAST BP <80 MM HG: CPT | Performed by: OBSTETRICS & GYNECOLOGY

## 2023-07-18 PROCEDURE — 87624 HPV HI-RISK TYP POOLED RSLT: CPT | Performed by: OBSTETRICS & GYNECOLOGY

## 2023-07-18 PROCEDURE — 3074F SYST BP LT 130 MM HG: CPT | Performed by: OBSTETRICS & GYNECOLOGY

## 2023-07-18 PROCEDURE — 99395 PREV VISIT EST AGE 18-39: CPT | Performed by: OBSTETRICS & GYNECOLOGY

## 2023-07-18 NOTE — PROGRESS NOTES
Michele Rivera is a 34year old female [id number] Patient's last menstrual period was 2023 (exact date). Patient presents with:  Wellness Visit: Last pap smear was 21, negative   Breast Problem: Left breast is having a lot of infections, was checked for mastitis and needs a mammogram ordered   . Patient c/o left breast discomfort - normal mammogram and had surgery f/u. Considering TTC, discussed weight loss    OBSTETRICS HISTORY:  OB History    Para Term  AB Living   1 1 1     1   SAB IAB Ectopic Multiple Live Births           1      # Outcome Date GA Lbr Rudi/2nd Weight Sex Delivery Anes PTL Lv   1 Term 22 39w1d 09:18 / :20 7 lb 9 oz (3.43 kg) M NORMAL SPONT EPI, Local N BRIAN       GYNE HISTORY:  Periods regular monthly      Sexual activity:   Not Currently      Partners:   Male                 MEDICAL HISTORY:  Past Medical History:   Diagnosis Date    Chronic low back pain     predating pregnancy, pt points to SI joint region    COVID-19 virus infection 2020    Mild symptoms    Hypothyroidism     Pap smear for cervical cancer screening 2021    Pap & HPV negative with outside provider    PCOS (polycystic ovarian syndrome)     Screening for genetic disease carrier status 2021    Carrier Screen Negative       SURGICAL HISTORY:  History reviewed. No pertinent surgical history.     SOCIAL HISTORY:  Social History    Socioeconomic History      Marital status:       Spouse name: Not on file      Number of children: Not on file      Years of education: Not on file      Highest education level: Not on file    Occupational History      Not on file    Tobacco Use      Smoking status: Never      Smokeless tobacco: Never    Vaping Use      Vaping Use: Never used    Substance and Sexual Activity      Alcohol use: Not Currently      Drug use: Never      Sexual activity: Not Currently        Partners: Male    Other Topics      Concerns:         Service: Not Asked        Blood Transfusions: Not Asked        Caffeine Concern: No          tea        Occupational Exposure: Not Asked        Hobby Hazards: Not Asked        Sleep Concern: Not Asked        Stress Concern: Not Asked        Weight Concern: Not Asked        Special Diet: Not Asked        Back Care: Not Asked        Exercise: No        Bike Helmet: Not Asked        Seat Belt: Yes        Self-Exams: Not Asked    Social History Narrative      Not on file    Social Determinants of Health  Financial Resource Strain: Not on file  Food Insecurity: Not on file  Transportation Needs: Not on file  Physical Activity: Not on file  Stress: Not on file  Social Connections: Not on file  Housing Stability: Not on file    FAMILY HISTORY:  Family History   Problem Relation Age of Onset    Hypertension Father         unknown    Thyroid Disorder Mother     Hypertension Mother         unknown    Cancer Maternal Grandfather         throat ca       MEDICATIONS:    Current Outpatient Medications:     LEVOTHYROXINE SODIUM OR, Take 62.5 mcg by mouth.  , Disp: , Rfl:     ALLERGIES:  No Known Allergies      Review of Systems:  Constitutional:  Denies fatigue, night sweats, hot flashes  Eyes:  denies blurred or double vision  Cardiovascular:  denies chest pain or palpitations  Respiratory:  denies shortness of breath  Gastrointestinal:  denies heartburn, abdominal pain, diarrhea or constipation  Genitourinary:  denies dysuria, incontinence, abnormal vaginal discharge, vaginal itching  Musculoskeletal:  denies back pain. Skin/Breast:  Denies any breast pain, lumps, or discharge. Neurological:  denies headaches, extremity weakness or numbness. Psychiatric: denies depression or anxiety. Endocrine:   denies excessive thirst or urination. Heme/Lymph:  denies history of anemia, easy bruising or bleeding.       PHYSICAL EXAM:   Constitutional: well developed, well nourished  Head/Face: normocephalic  Neck/Thyroid: thyroid symmetric, no thyromegaly, no nodules, no adenopathy  Lymphatic:no abnormal supraclavicular or axillary adenopathy is noted  Breast: normal without palpable masses, tenderness, asymmetry, nipple discharge, nipple retraction or skin changes  Abdomen:  soft, nontender, nondistended, no masses  Skin/Hair: no unusual rashes or bruises  Extremities: no edema, no cyanosis  Psychiatric:  Oriented to time, place, person and situation.  Appropriate mood and affect    Pelvic Exam:  External Genitalia: normal appearance, hair distribution, and no lesions  Urethral Meatus:  normal in size, location, without lesions and prolapse  Bladder:  No fullness, masses or tenderness  Vagina:  Normal appearance without lesions, no abnormal discharge  Cervix:  Normal without tenderness on motion  Uterus: normal in size, contour, position, mobility, without tenderness  Adnexa: normal without masses or tenderness  Perineum: normal  Anus: no hemorroids     Assessment & Plan:  Diagnoses and all orders for this visit:    Encounter for well woman exam with routine gynecological exam    Cervical cancer screening  -     HPV HIGH RISK , THIN PREP COLLECTION  -     THINPREP PAP SMEAR B

## 2023-07-19 LAB — HPV I/H RISK 1 DNA SPEC QL NAA+PROBE: NEGATIVE

## 2023-09-25 ENCOUNTER — OFFICE VISIT (OUTPATIENT)
Dept: FAMILY MEDICINE CLINIC | Facility: CLINIC | Age: 33
End: 2023-09-25
Payer: COMMERCIAL

## 2023-09-25 VITALS
DIASTOLIC BLOOD PRESSURE: 76 MMHG | WEIGHT: 200 LBS | OXYGEN SATURATION: 99 % | TEMPERATURE: 98 F | HEIGHT: 67 IN | BODY MASS INDEX: 31.39 KG/M2 | HEART RATE: 67 BPM | SYSTOLIC BLOOD PRESSURE: 118 MMHG | RESPIRATION RATE: 18 BRPM

## 2023-09-25 DIAGNOSIS — J01.00 ACUTE NON-RECURRENT MAXILLARY SINUSITIS: Primary | ICD-10-CM

## 2023-09-25 DIAGNOSIS — R05.1 ACUTE COUGH: ICD-10-CM

## 2023-09-25 PROCEDURE — 87635 SARS-COV-2 COVID-19 AMP PRB: CPT | Performed by: FAMILY MEDICINE

## 2023-09-25 RX ORDER — MELOXICAM 15 MG/1
15 TABLET ORAL DAILY
COMMUNITY
Start: 2023-07-12

## 2023-09-25 RX ORDER — AMOXICILLIN AND CLAVULANATE POTASSIUM 875; 125 MG/1; MG/1
1 TABLET, FILM COATED ORAL 2 TIMES DAILY
Qty: 20 TABLET | Refills: 0 | Status: SHIPPED | OUTPATIENT
Start: 2023-09-25 | End: 2023-10-05

## 2023-09-25 RX ORDER — NAPROXEN 500 MG/1
500 TABLET ORAL 2 TIMES DAILY WITH MEALS
COMMUNITY
Start: 2023-08-29

## 2023-09-25 RX ORDER — CELECOXIB 200 MG/1
200 CAPSULE ORAL 2 TIMES DAILY
COMMUNITY
Start: 2023-08-28

## 2023-09-25 NOTE — PATIENT INSTRUCTIONS
Stop azithromycin now. Start augmentin once COVID testing is back and negative. Take antibiotics with food and plenty of water. Eat yogurt or take probiotic daily. (Herbert Smith is a good example of an OTC probiotic)  Make sure to finish the entire antibiotic treatment. Increase fluids and rest.   Use otc meds as needed  You may continue allegra once daily. Monitor symptoms and contact the office if no better in 2-3 days.

## 2023-09-26 LAB — SARS-COV-2 RNA RESP QL NAA+PROBE: NOT DETECTED

## 2023-09-27 ENCOUNTER — TELEMEDICINE (OUTPATIENT)
Dept: TELEHEALTH | Age: 33
End: 2023-09-27
Payer: COMMERCIAL

## 2023-09-27 DIAGNOSIS — Z20.822 EXPOSURE TO COVID-19 VIRUS: ICD-10-CM

## 2023-09-27 DIAGNOSIS — J06.9 VIRAL URI: Primary | ICD-10-CM

## 2023-09-27 PROCEDURE — 99213 OFFICE O/P EST LOW 20 MIN: CPT | Performed by: NURSE PRACTITIONER

## 2023-09-27 RX ORDER — FLUTICASONE PROPIONATE 50 MCG
2 SPRAY, SUSPENSION (ML) NASAL DAILY
Qty: 1 EACH | Refills: 0 | Status: SHIPPED | OUTPATIENT
Start: 2023-09-27

## 2023-09-27 NOTE — PROGRESS NOTES
Virtual/Telephone Check-In    Amie Priest verbally consents to a Virtual/Telephone Check-In service on 09/27/23. Patient has been referred to the [facility] website at [email or web address] to review the yearly Consent to Treat document. Patient understands and accepts financial responsibility for any deductible, co-insurance and/or co-pays associated with this service. Telehealth Verbal Consent   I conducted a telehealth visit with Amie Priest today, 09/27/23, which was completed using two-way, real-time interactive audio and video communication. This has been done in good morgan to provide continuity of care in the best interest of the provider-patient relationship. Every conscious effort was taken to allow for sufficient and adequate time to complete the visit. The patient was made aware of the limitations of the telehealth visit, including treatment limitations as physical exam through video visit is limited. The patient was advised to call 911 or to go to the ER in case there was an emergency. The patient was also advised of the potential privacy & security concerns related to the telehealth platform. The patient was made aware of where to find [facility] notice of privacy practices, telehealth consent form and other related consent forms and documents. which are located on the [facility] website. The patient verbally agreed to telehealth consent form, related consents and the risks discussed. Lastly, the patient confirmed that they were in [place]. Included in this visit, time may have been spent reviewing labs, medications, radiology tests and decision making. Appropriate medical decision-making and tests are ordered as detailed in the plan of care above. Coding/billing information is submitted for this visit based on complexity of care and/or time spent for the visit.     CHIEF COMPLAINT:   Patient presents with:  Upper Respiratory Infection      HPI:   Amie Priest is a 28year old female who presents for a video visit. Patient reports cold symptoms for 2 weeks. Reports runny nose and cough a few weeks ago which improved and then returned 4-5 days ago. Was seen in walk in clinic on 9/25, prescribed augmentin for sinusitis. Reports she feels somewhat better today but feels right sided ear pressure and pain and diminished sense of smell and taste. Reports 2 household members recently had COVID this past week. Had negative COVID test in clinic 2 days ago. Denies shortness of breath or fever. Treating with mucinex cold and cough, tylenol and claritin. Current Outpatient Medications   Medication Sig Dispense Refill    celecoxib 200 MG Oral Cap Take 1 capsule (200 mg total) by mouth 2 (two) times daily. (Patient not taking: Reported on 9/25/2023)      dicloxacillin 500 MG Oral Cap Take 1 capsule (500 mg total) by mouth every 6 (six) hours. (Patient not taking: Reported on 9/25/2023)      Meloxicam 15 MG Oral Tab Take 1 tablet (15 mg total) by mouth daily. (Patient not taking: Reported on 9/25/2023)      naproxen 500 MG Oral Tab Take 1 tablet (500 mg total) by mouth 2 (two) times daily with meals. (Patient not taking: Reported on 9/25/2023)      amoxicillin clavulanate 875-125 MG Oral Tab Take 1 tablet by mouth 2 (two) times daily for 10 days. 20 tablet 0    LEVOTHYROXINE SODIUM OR Take 62.5 mcg by mouth. Past Medical History:   Diagnosis Date    Chronic low back pain     predating pregnancy, pt points to SI joint region    COVID-19 virus infection 12/2020    Mild symptoms    Hypothyroidism     Pap smear for cervical cancer screening 01/22/2021    Pap & HPV negative with outside provider    PCOS (polycystic ovarian syndrome)     Screening for genetic disease carrier status 07/09/2021    Carrier Screen Negative      No past surgical history on file.       Social History     Socioeconomic History    Marital status:    Tobacco Use    Smoking status: Never    Smokeless tobacco: Never   Vaping Use Vaping Use: Never used   Substance and Sexual Activity    Alcohol use: Not Currently    Drug use: Never    Sexual activity: Not Currently     Partners: Male   Other Topics Concern    Caffeine Concern No     Comment: tea    Exercise No    Seat Belt Yes         REVIEW OF SYSTEMS:   GENERAL: decreased appetite. +body aches  SKIN: no rashes or abnormal skin lesions  HEENT: See HPI  LUNGS: denies shortness of breath or wheezing, See HPI  CARDIOVASCULAR: denies chest pain or palpitations   GI: denies N/V/C or abdominal pain  NEURO: Denies headaches    EXAM:   General: Alert, Ill-appearing/sounding, and In no acute distress, non toxic appearing  Respiratory:   Speaking in full sentences comfortably  Normal work of breathing  No cough during visit  Head: Normocephalic  Nose: No obvious nasal discharge. Skin: No obvious rashes or lesions from what observed. No results found for this or any previous visit (from the past 24 hour(s)). ASSESSMENT AND PLAN:   Maria Luisa Newell is a 28year old female who presents with symptoms that are consistent with    ASSESSMENT:   Viral uri  (primary encounter diagnosis)  Exposure to covid-19 virus    PLAN:   Suspicious for COVID infection given close exposure by 2 household members and symptoms of loss of taste/ smell. Discussed CDC guidelines for COVID self isolation, advised she may wish to repeat test on home test.   Discussed comfort measures. May continue antibiotic prescribed in clinic to cover for any possible ear infection or sinus infection. Meds as below. To follow up in person for new or worsening symptoms or if not improving over the next 4-5 days. Meds & Refills for this Visit:  Requested Prescriptions     Signed Prescriptions Disp Refills    fluticasone propionate 50 MCG/ACT Nasal Suspension 1 each 0     Si sprays by Each Nare route daily. Risks, benefits, and side effects of medication explained and discussed.     There are no Patient Instructions on file for this visit. The patient indicates understanding of these issues and agrees to the plan. The patient is asked to return if sx's persist or worsen. Jake Connolly understands video visit evaluation is not a substitute for face-to-face examination or emergency care. Patient advised to go to ER or call 911 for worsening symptoms or acute distress.

## 2023-09-29 ENCOUNTER — OFFICE VISIT (OUTPATIENT)
Dept: OTOLARYNGOLOGY | Facility: CLINIC | Age: 33
End: 2023-09-29

## 2023-09-29 VITALS — WEIGHT: 200 LBS | BODY MASS INDEX: 31.39 KG/M2 | HEIGHT: 67 IN

## 2023-09-29 DIAGNOSIS — H69.90 EUSTACHIAN TUBE DISORDER, UNSPECIFIED LATERALITY: Primary | ICD-10-CM

## 2023-09-29 DIAGNOSIS — H65.91 FLUID LEVEL BEHIND TYMPANIC MEMBRANE OF RIGHT EAR: ICD-10-CM

## 2023-09-29 PROCEDURE — 92504 EAR MICROSCOPY EXAMINATION: CPT | Performed by: STUDENT IN AN ORGANIZED HEALTH CARE EDUCATION/TRAINING PROGRAM

## 2023-09-29 PROCEDURE — 3008F BODY MASS INDEX DOCD: CPT | Performed by: STUDENT IN AN ORGANIZED HEALTH CARE EDUCATION/TRAINING PROGRAM

## 2023-09-29 PROCEDURE — 99203 OFFICE O/P NEW LOW 30 MIN: CPT | Performed by: STUDENT IN AN ORGANIZED HEALTH CARE EDUCATION/TRAINING PROGRAM

## 2023-09-29 RX ORDER — PSEUDOEPHEDRINE HCL 120 MG/1
120 TABLET, FILM COATED, EXTENDED RELEASE ORAL EVERY 12 HOURS
Qty: 42 TABLET | Refills: 0 | Status: SHIPPED | OUTPATIENT
Start: 2023-09-29 | End: 2023-10-20

## 2025-05-30 ENCOUNTER — APPOINTMENT (OUTPATIENT)
Dept: ULTRASOUND IMAGING | Facility: HOSPITAL | Age: 35
End: 2025-05-30
Attending: EMERGENCY MEDICINE
Payer: COMMERCIAL

## 2025-05-30 ENCOUNTER — HOSPITAL ENCOUNTER (EMERGENCY)
Facility: HOSPITAL | Age: 35
Discharge: HOME | End: 2025-05-30
Attending: EMERGENCY MEDICINE
Payer: COMMERCIAL

## 2025-05-30 VITALS
TEMPERATURE: 99 F | RESPIRATION RATE: 18 BRPM | DIASTOLIC BLOOD PRESSURE: 76 MMHG | OXYGEN SATURATION: 100 % | WEIGHT: 165.38 LBS | HEART RATE: 67 BPM | BODY MASS INDEX: 26.58 KG/M2 | SYSTOLIC BLOOD PRESSURE: 104 MMHG | HEIGHT: 66 IN

## 2025-05-30 DIAGNOSIS — M54.32 SCIATICA OF LEFT SIDE: Primary | ICD-10-CM

## 2025-05-30 LAB
ALBUMIN SERPL-MCNC: 4.5 G/DL (ref 3.2–4.8)
ALBUMIN/GLOB SERPL: 1.5 {RATIO} (ref 1–2)
ALP LIVER SERPL-CCNC: 54 U/L (ref 37–98)
ALT SERPL-CCNC: 11 U/L (ref 10–49)
ANION GAP SERPL CALC-SCNC: 11 MMOL/L (ref 0–18)
AST SERPL-CCNC: 17 U/L (ref ?–34)
B-HCG SERPL-ACNC: ABNORMAL MIU/ML (ref ?–4.2)
BASOPHILS # BLD AUTO: 0.04 X10(3) UL (ref 0–0.2)
BASOPHILS NFR BLD AUTO: 0.5 %
BILIRUB SERPL-MCNC: 0.4 MG/DL (ref 0.3–1.2)
BILIRUB UR QL STRIP.AUTO: NEGATIVE
BUN BLD-MCNC: <5 MG/DL (ref 9–23)
CALCIUM BLD-MCNC: 9.1 MG/DL (ref 8.7–10.6)
CHLORIDE SERPL-SCNC: 106 MMOL/L (ref 98–112)
CLARITY UR REFRACT.AUTO: CLEAR
CO2 SERPL-SCNC: 22 MMOL/L (ref 21–32)
COLOR UR AUTO: COLORLESS
CREAT BLD-MCNC: 0.59 MG/DL (ref 0.55–1.02)
EGFRCR SERPLBLD CKD-EPI 2021: 120 ML/MIN/1.73M2 (ref 60–?)
EOSINOPHIL # BLD AUTO: 0.05 X10(3) UL (ref 0–0.7)
EOSINOPHIL NFR BLD AUTO: 0.6 %
ERYTHROCYTE [DISTWIDTH] IN BLOOD BY AUTOMATED COUNT: 13 %
GLOBULIN PLAS-MCNC: 3 G/DL (ref 2–3.5)
GLUCOSE BLD-MCNC: 85 MG/DL (ref 70–99)
GLUCOSE UR STRIP.AUTO-MCNC: NORMAL MG/DL
HCT VFR BLD AUTO: 38.2 % (ref 35–48)
HGB BLD-MCNC: 12.8 G/DL (ref 12–16)
IMM GRANULOCYTES # BLD AUTO: 0.02 X10(3) UL (ref 0–1)
IMM GRANULOCYTES NFR BLD: 0.2 %
LEUKOCYTE ESTERASE UR QL STRIP.AUTO: NEGATIVE
LIPASE SERPL-CCNC: 40 U/L (ref 12–53)
LYMPHOCYTES # BLD AUTO: 1.98 X10(3) UL (ref 1–4)
LYMPHOCYTES NFR BLD AUTO: 24.2 %
MCH RBC QN AUTO: 28.4 PG (ref 26–34)
MCHC RBC AUTO-ENTMCNC: 33.5 G/DL (ref 31–37)
MCV RBC AUTO: 84.7 FL (ref 80–100)
MONOCYTES # BLD AUTO: 0.48 X10(3) UL (ref 0.1–1)
MONOCYTES NFR BLD AUTO: 5.9 %
NEUTROPHILS # BLD AUTO: 5.62 X10 (3) UL (ref 1.5–7.7)
NEUTROPHILS # BLD AUTO: 5.62 X10(3) UL (ref 1.5–7.7)
NEUTROPHILS NFR BLD AUTO: 68.6 %
NITRITE UR QL STRIP.AUTO: NEGATIVE
PH UR STRIP.AUTO: 6.5 [PH] (ref 5–8)
PLATELET # BLD AUTO: 213 10(3)UL (ref 150–450)
POTASSIUM SERPL-SCNC: 3.7 MMOL/L (ref 3.5–5.1)
PROT SERPL-MCNC: 7.5 G/DL (ref 5.7–8.2)
PROT UR STRIP.AUTO-MCNC: NEGATIVE MG/DL
RBC # BLD AUTO: 4.51 X10(6)UL (ref 3.8–5.3)
RBC UR QL AUTO: NEGATIVE
SODIUM SERPL-SCNC: 139 MMOL/L (ref 136–145)
SP GR UR STRIP.AUTO: <1.005 (ref 1–1.03)
UROBILINOGEN UR STRIP.AUTO-MCNC: NORMAL MG/DL
WBC # BLD AUTO: 8.2 X10(3) UL (ref 4–11)

## 2025-05-30 PROCEDURE — 85025 COMPLETE CBC W/AUTO DIFF WBC: CPT

## 2025-05-30 PROCEDURE — 83690 ASSAY OF LIPASE: CPT | Performed by: EMERGENCY MEDICINE

## 2025-05-30 PROCEDURE — 85025 COMPLETE CBC W/AUTO DIFF WBC: CPT | Performed by: EMERGENCY MEDICINE

## 2025-05-30 PROCEDURE — 81003 URINALYSIS AUTO W/O SCOPE: CPT | Performed by: EMERGENCY MEDICINE

## 2025-05-30 PROCEDURE — 84702 CHORIONIC GONADOTROPIN TEST: CPT | Performed by: EMERGENCY MEDICINE

## 2025-05-30 PROCEDURE — 84702 CHORIONIC GONADOTROPIN TEST: CPT

## 2025-05-30 PROCEDURE — 80053 COMPREHEN METABOLIC PANEL: CPT | Performed by: EMERGENCY MEDICINE

## 2025-05-30 PROCEDURE — 99284 EMERGENCY DEPT VISIT MOD MDM: CPT

## 2025-05-30 PROCEDURE — 83690 ASSAY OF LIPASE: CPT

## 2025-05-30 PROCEDURE — 99285 EMERGENCY DEPT VISIT HI MDM: CPT

## 2025-05-30 PROCEDURE — 81003 URINALYSIS AUTO W/O SCOPE: CPT

## 2025-05-30 PROCEDURE — 76801 OB US < 14 WKS SINGLE FETUS: CPT | Performed by: EMERGENCY MEDICINE

## 2025-05-30 PROCEDURE — 80053 COMPREHEN METABOLIC PANEL: CPT

## 2025-05-30 PROCEDURE — 36415 COLL VENOUS BLD VENIPUNCTURE: CPT

## 2025-05-30 PROCEDURE — 76817 TRANSVAGINAL US OBSTETRIC: CPT | Performed by: EMERGENCY MEDICINE

## 2025-05-30 RX ORDER — PREDNISONE 20 MG/1
60 TABLET ORAL DAILY
Qty: 9 TABLET | Refills: 0 | Status: SHIPPED | OUTPATIENT
Start: 2025-05-30 | End: 2025-06-02

## 2025-05-30 NOTE — ED INITIAL ASSESSMENT (HPI)
at 6 weeks gestation with new onset of pain to her left back and groin and heaviness in her left leg. She reached out to her OB who referred patient to ED to r/o ectopic.

## 2025-05-30 NOTE — DISCHARGE INSTRUCTIONS
Just Tylenol for now.  If symptoms worsen or do not improve in the next 2 to 3 days you can fill and take the short course of prednisone.  Follow-up with your primary care doctor or OB.

## 2025-05-30 NOTE — ED PROVIDER NOTES
Patient Seen in: [facility] Emergency Department        History  Chief Complaint   Patient presents with    Abdomen/Flank Pain     Stated Complaint: 6 weeks preg, L sided flank/back pain    Subjective:   HPI            Patient is a 34-year-old female is approximately 6 weeks pregnant by dates presenting for evaluation of left-sided back pain radiating down her left lower extremity.  Back pain has been there for few days, no fall or trauma of any kind.  The radiation is new since yesterday.  Leg feels vaguely heavy.  No paresthesias.  No bowel or bladder symptoms.  She reports she did have sciatica on the right side during her prior pregnancy although in the third trimester and this reminds her very much of that.  Talk to her OB/GYN advised her to come in for evaluation.      Objective:     Past Medical History:    Chronic low back pain    predating pregnancy, pt points to SI joint region    COVID-19 virus infection    Mild symptoms    Hypothyroidism    Pap smear for cervical cancer screening    Pap & HPV negative with outside provider    PCOS (polycystic ovarian syndrome)    Screening for genetic disease carrier status    Carrier Screen Negative              History reviewed. No pertinent surgical history.             Social History     Socioeconomic History    Marital status:    Tobacco Use    Smoking status: Never    Smokeless tobacco: Never   Vaping Use    Vaping status: Never Used   Substance and Sexual Activity    Alcohol use: Not Currently    Drug use: Never    Sexual activity: Not Currently     Partners: Male   Other Topics Concern    Caffeine Concern No     Comment: tea    Exercise No    Seat Belt Yes                                Physical Exam    ED Triage Vitals   BP 05/30/25 1418 116/72   Pulse 05/30/25 1418 86   Resp 05/30/25 1418 18   Temp 05/30/25 1418 99.2 °F (37.3 °C)   Temp src 05/30/25 1418 Oral   SpO2 05/30/25 1418 100 %   O2 Device 05/30/25 1528 None (Room air)       Current  Vitals:   Vital Signs  BP: 114/76  Pulse: 66  Resp: 18  Temp: 99.2 °F (37.3 °C)  Temp src: Oral  MAP (mmHg): 85    Oxygen Therapy  SpO2: 100 %  O2 Device: None (Room air)            Physical Exam  Vitals and nursing note reviewed.   Constitutional:       Appearance: She is well-developed.   HENT:      Head: Normocephalic and atraumatic.   Eyes:      Conjunctiva/sclera: Conjunctivae normal.      Pupils: Pupils are equal, round, and reactive to light.   Cardiovascular:      Rate and Rhythm: Normal rate and regular rhythm.      Heart sounds: Normal heart sounds.   Pulmonary:      Effort: Pulmonary effort is normal.      Breath sounds: Normal breath sounds.   Abdominal:      General: Bowel sounds are normal.      Palpations: Abdomen is soft.      Comments: Nontender/nondistended.  No rebound or guarding.   Musculoskeletal:         General: Normal range of motion.      Cervical back: Normal range of motion and neck supple.      Comments: Good distal pulses left lower extremity.  Mild pain with straight leg raise at about 45 degrees but pain does not radiate, more distal pulling in the back.  Compartments are soft.   Skin:     General: Skin is warm and dry.   Neurological:      Mental Status: She is alert and oriented to person, place, and time.                 ED Course  Labs Reviewed   COMP METABOLIC PANEL (14) - Abnormal; Notable for the following components:       Result Value    BUN <5 (*)     All other components within normal limits   URINALYSIS WITH CULTURE REFLEX - Abnormal; Notable for the following components:    Urine Color Colorless (*)     Spec Gravity <1.005 (*)     Ketones Urine Trace (*)     All other components within normal limits   HCG, BETA SUBUNIT (QUANT PREGNANCY TEST) - Abnormal; Notable for the following components:    Hcg Quantitative 54,432.0 (*)     All other components within normal limits   LIPASE - Normal   CBC WITH DIFFERENTIAL WITH PLATELET   RAINBOW DRAW LAVENDER   RAINBOW DRAW LIGHT  GREEN   RAINBOW DRAW GOLD   RAINBOW DRAW BLUE          US PREG 1ST TRIM W/EV (CPT=76801/65310)  Result Date: 5/30/2025  PROCEDURE:  US OB W/ EV 1ST TRIMESTER (CPT=76801/41902)  COMPARISON:  None.  INDICATIONS:  Left-sided flank and abdominal pain, rule out ectopic  TECHNIQUE:  Transabdominal and endovaginal pelvic ultrasound examinations were performed.  PATIENT STATED HISTORY: (As transcribed by Technologist)     FINDINGS:   UTERUS:  Size:  8.5 x 5.2 x 4.8 cm.  Gestational sac:  Present, does not appear irregular or low lying, no subchorionic bleed  Yolk sac:  3 mm  Fetal pole:  5 mm  Heart beat:  112 BPM  RIGHT OVARY:  3.3 x 1.6 x 1.8 cm.  The right ovary appears normal in size, shape, and echogenicity. No significant masses are identified.  LEFT OVARY:  3.7 x 2.4 x 2.9 cm.  Cyst probable corpus luteum 2.3 x 2.1 cm  CUL-DE-SAC:  Small free fluid is some debris  OTHER:  Negative.   Appropriate non-emergency based obstetrical care is advised, including any appropriate ultrasound imaging followup.            CONCLUSION:  Single living intrauterine gestation, with estimated gestational age 6 weeks, 1 day based on crown-rump length of 5 mm.  No subchorionic bleed.  Fetal heart rate 112 BPM.  Small free fluid the pelvis.  Corpus luteum left ovary.  Clinical and  sonographic follow-up advised.    LOCATION:  [id number]   Dictated by (CST): Zander Liang MD on 5/30/2025 at 4:29 PM     Finalized by (CST): Zander Liang MD on 5/30/2025 at 4:37 PM                         MDM     34-year-old female presenting for evaluation of what sounds like left-sided sciatica.  However she is not early in pregnancy and has not had ultrasound yet so will be sent here for further evaluation.  No abdominal pain or tenderness will send her for an ultrasound to rule out ectopic.  There is no swelling and I do not think she needs an ultrasound of her leg and she reports this feels very much like her prior episode of sciatica.    Update at 5:20  PM.  Ultrasound as above demonstrates live IUP.  Overall quite consistent with sciatica and the patient is comfortable going home.  Discussed limitations of medication options in the first trimester and she and her  understands.  Will send a short course of prednisone, she is going to just try Tylenol for now but will fill and start taking it in a couple of days if symptoms are worsening or not improving.      Past Medical History-none    Differential diagnosis before testing included sciatica, DVT, ectopic pregnancy    Co-morbidities that add to the complexity of management include: None    Testing ordered during this visit included labs, ultrasound    Radiographic images  I personally reviewed the radiographs and my individual interpretation shows live IUP, no ectopic  I also reviewed the official reports that showed live IUP, no ectopic      History obtained by an independent source included from  at bedside            Disposition:        Discharge  I have discussed with the patient the results of test, differential diagnosis, treatment plan, warning signs and symptoms which should prompt immediate return.  They expressed understanding of these instructions and agrees to the following plan provided.  They were given written discharge instructions and agrees to return for any concerns and voiced understanding and all questions were answered.      Medical Decision Making      Disposition and Plan     Clinical Impression:  1. Sciatica of left side         Disposition:  Discharge  5/30/2025  5:25 pm    Follow-up:  Nickie Sanches MD  [address] [place] [id number] [phone]    Follow up            Medications Prescribed:  Current Discharge Medication List        START taking these medications    Details   predniSONE 20 MG Oral Tab Take 3 tablets (60 mg total) by mouth daily for 3 days.  Qty: 9 tablet, Refills: 0                   Supplementary Documentation:

## (undated) DIAGNOSIS — N76.0 BACTERIAL VAGINOSIS: ICD-10-CM

## (undated) DIAGNOSIS — B96.89 BACTERIAL VAGINOSIS: ICD-10-CM

## (undated) DIAGNOSIS — B96.89 BACTERIAL VAGINOSIS: Primary | ICD-10-CM

## (undated) DIAGNOSIS — N76.0 BACTERIAL VAGINOSIS: Primary | ICD-10-CM

## (undated) NOTE — LETTER
Patient Name: Gautam Bolivar  YOB: 1990          MRN :  [id number]  Date:  11/4/2021  Referring Physician:  No ref. provider found    Discharge Summary  Pt has attended 4 visits in Physical Therapy. Dx: 24 weeks gestation of pregnancy ([place]. 5-/5  Lats: R 5-/5 (WAS:4/5), L 5-/5  Low trap: R 5-/5 (WAS:4/5); L 5-/5     Strength: R 45 LBS (WAS:35) lbs; L 45 lbs    R handed Hip flexion (L2): B 5-/5 (WAS:R 4-/5; L 4+/5)  Hip abduction: B 5-/5 (WAS:R 4-/5; L 4+/5)  Hip Extension: B 5-/5 (WAS:R 4 transferring with </=2/10 pain -MET  · Pt will improve cervical AROM rotation to >/=70 degrees to improve tolerance for turning head to check blind spot while driving-MET  · Pt will improve postural strength (mid/low trap) to 5-/5 to promote improved uprig

## (undated) NOTE — LETTER
Patient Name: Artemio Clifford  : 1990  MRN: [id number]  Patient Address: [address] [place] [address]      Coronavirus Disease 2019 (COVID-19)     [facility] is committed to the safety and well-being of our patients, members, Frances Yo If your symptoms get worse, call your healthcare provider immediately. 3. Get rest and stay hydrated.    4. If you have a medical appointment, call the healthcare provider ahead of time and tell them that you have or may have COVID-19.  5. For medical gunjan fever-reducing medications; and  · Improvement in respiratory symptoms (e.g., cough, shortness of breath); and  · At least 10 days have passed since symptoms first appeared OR if asymptomatic patient or date of symptom onset is unclear then use 10 days pos donors must:    · Have had a confirmed diagnosis of COVID-19  · Be symptom-free for at least 14 days*    *Some people will be required to have a repeat COVID-19 test in order to be eligible to donate.  If you’re instructed by Tracy that a repeat test is r random. Researchers are trying to identify similarities between people with a Post-COVID condition to better understand if there are risk factors. How do I prevent a Post-COVID condition?   The best way to prevent the long-term symptoms of COVID-19 is

## (undated) NOTE — LETTER
Patient Name: Artemio Clifford  YOB: 1990          MRN :  [id number]  Date:  10/18/2021  Referring Physician:  Saroj Ramos    SPINE EVALUATION:   Referring Physician: Dr. Lane Prather, Dr. Konstantin Robbins  Diagnosis: 24 weeks gestation of pregnancy ( tests and measures show fair posture and body mechanics, decreased range of motion and strength, increased muscle tightness, + neural tension R sciatic nn, but cervical nn testing was not positive in clinic today.  Increased symptoms may be attributed to [place] 5-/5  Low trap: R 4/5; L 5-/5     Strength: R 35 lbs; L 45 lbs    R handed Hip flexion (L2): R 4-/5; L 4+/5  Hip abduction: R 4-/5; L 4+/5  Hip Extension: R 4-/5; L 4+/5   Hip ER: R 5/5; L 5/5  Hip IR: R 5/5; L 5/5  Knee Flexion: R 5/5; L 5/5   Knee ex limitations, and evolving symptoms including multiple regions of chronic pain.   PLAN OF CARE:    Goals: (to be met in 12 visits)   · Pt will improve transversus abdominis recruitment to perform proper isometric contraction without requiring verbal or tacti therapist: Desiree Ramirez, PT    [de-identified] certification required: Yes  I certify the need for these services furnished under this plan of treatment and while under my care.     X___________________________________________________ Date_________________